# Patient Record
Sex: FEMALE | Race: WHITE | Employment: OTHER | URBAN - METROPOLITAN AREA
[De-identification: names, ages, dates, MRNs, and addresses within clinical notes are randomized per-mention and may not be internally consistent; named-entity substitution may affect disease eponyms.]

---

## 2017-02-10 ENCOUNTER — OFFICE VISIT (OUTPATIENT)
Dept: HEMATOLOGY/ONCOLOGY | Facility: HOSPITAL | Age: 62
End: 2017-02-10
Attending: INTERNAL MEDICINE
Payer: COMMERCIAL

## 2017-02-10 VITALS
DIASTOLIC BLOOD PRESSURE: 79 MMHG | TEMPERATURE: 98 F | HEART RATE: 77 BPM | SYSTOLIC BLOOD PRESSURE: 158 MMHG | BODY MASS INDEX: 38.77 KG/M2 | RESPIRATION RATE: 18 BRPM | HEIGHT: 62.99 IN | WEIGHT: 218.81 LBS | OXYGEN SATURATION: 95 %

## 2017-02-10 DIAGNOSIS — Z85.3 HISTORY OF BREAST CANCER: Primary | ICD-10-CM

## 2017-02-10 PROCEDURE — 99213 OFFICE O/P EST LOW 20 MIN: CPT | Performed by: INTERNAL MEDICINE

## 2017-02-10 RX ORDER — METOPROLOL SUCCINATE 25 MG/1
TABLET, EXTENDED RELEASE ORAL
Refills: 9 | Status: ON HOLD | COMMUNITY
Start: 2017-02-09 | End: 2019-04-18

## 2017-02-10 RX ORDER — ANASTROZOLE 1 MG/1
1 TABLET ORAL DAILY
Qty: 90 TABLET | Refills: 3 | Status: SHIPPED | OUTPATIENT
Start: 2017-02-10 | End: 2017-02-16

## 2017-02-10 RX ORDER — AMLODIPINE BESYLATE 5 MG/1
TABLET ORAL
Refills: 1 | COMMUNITY
Start: 2017-01-12 | End: 2017-02-10 | Stop reason: DRUGHIGH

## 2017-02-10 NOTE — PROGRESS NOTES
Pt is here for MD f/u for breast cancer. Last mammogram was in September with Dr Norma Diaz. Last DEXA was in 2015. On Arimidex. Tolerating well. Denies any complaints.        Education Record    Learner:  Patient    Disease / Diagnosis:  Breast cancer     Ba

## 2017-02-13 NOTE — PROGRESS NOTES
[facility]    PATIENT'S NAME: Kymberly Brendan   ATTENDING PHYSICIAN: Silvia Willoughby M.D.    PATIENT ACCOUNT #: [de-identified] LOCATION: [address] [place] [address] RECORD #: [id number] YOB: 1955   DATE OF SERVICE: 02/10/2017       CANCER CENT 77, respiratory rate 20, temperature 98.0. HEENT:  Unremarkable. She has pink conjunctivae, anicteric sclerae. Pharynx without lesions. LYMPHATICS:  She has no cervical, supraclavicular, or axillary adenopathy.    LUNGS:  Resonant to percussion and c

## 2017-02-16 ENCOUNTER — TELEPHONE (OUTPATIENT)
Dept: HEMATOLOGY/ONCOLOGY | Facility: HOSPITAL | Age: 62
End: 2017-02-16

## 2017-02-16 RX ORDER — ANASTROZOLE 1 MG/1
1 TABLET ORAL DAILY
Qty: 90 TABLET | Refills: 3 | Status: SHIPPED | OUTPATIENT
Start: 2017-02-16 | End: 2018-02-16

## 2017-02-16 NOTE — TELEPHONE ENCOUNTER
Had not heard from pharmacy re: Anastrozole refill. Verified pharmacy information. Script resent to [facility] in [place].

## 2018-02-13 PROBLEM — M25.362 KNEE INSTABILITY, LEFT: Status: ACTIVE | Noted: 2018-02-13

## 2018-02-13 PROBLEM — Z96.651 STATUS POST RIGHT KNEE REPLACEMENT: Status: ACTIVE | Noted: 2018-02-13

## 2018-02-13 PROBLEM — Z96.652 STATUS POST LEFT KNEE REPLACEMENT: Status: ACTIVE | Noted: 2018-02-13

## 2018-02-13 PROBLEM — S80.01XA CONTUSION OF RIGHT KNEE, INITIAL ENCOUNTER: Status: ACTIVE | Noted: 2018-02-13

## 2018-02-13 PROBLEM — M25.562 LEFT KNEE PAIN, UNSPECIFIED CHRONICITY: Status: ACTIVE | Noted: 2018-02-13
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
## 2018-02-16 ENCOUNTER — OFFICE VISIT (OUTPATIENT)
Dept: HEMATOLOGY/ONCOLOGY | Facility: HOSPITAL | Age: 63
End: 2018-02-16
Attending: INTERNAL MEDICINE
Payer: COMMERCIAL

## 2018-02-16 VITALS
TEMPERATURE: 98 F | WEIGHT: 211.19 LBS | HEART RATE: 79 BPM | DIASTOLIC BLOOD PRESSURE: 83 MMHG | OXYGEN SATURATION: 96 % | HEIGHT: 62.99 IN | RESPIRATION RATE: 18 BRPM | BODY MASS INDEX: 37.42 KG/M2 | SYSTOLIC BLOOD PRESSURE: 143 MMHG

## 2018-02-16 DIAGNOSIS — Z85.3 HISTORY OF BREAST CANCER: Primary | ICD-10-CM

## 2018-02-16 PROCEDURE — 99213 OFFICE O/P EST LOW 20 MIN: CPT | Performed by: INTERNAL MEDICINE

## 2018-02-16 RX ORDER — ANASTROZOLE 1 MG/1
1 TABLET ORAL DAILY
Qty: 90 TABLET | Refills: 3 | Status: SHIPPED | OUTPATIENT
Start: 2018-02-16 | End: 2019-02-15

## 2018-02-16 NOTE — PROGRESS NOTES
Patient is here for MD f/u for breast cancer. Pt is on Arimidex daily. Patient had DEXA scan and mammogram in October with Dr Marty Galindo. Feeling well. No complaints.        Education Record    Learner:  Patient    Disease / Diagnosis:  Breast Cancer     [facility]

## 2018-02-26 NOTE — PROGRESS NOTES
[facility]    PATIENT'S NAME: Wing Bloom   ATTENDING PHYSICIAN: Hiro Hughes M.D.    PATIENT ACCOUNT #: [de-identified] LOCATION: [address] [place] [address] RECORD #: [id number] YOB: 1955   DATE OF SERVICE: 02/16/2018       CANCER RUTH GENERAL:  She is a well-developed, obese female in no acute distress. VITAL SIGNS:  Her performance status is 0. Her weight is 211 pounds. She is 5 feet 3 inches. Blood pressure is 143/83, pulse 79, respiratory rate is 20, temperature is 98. 3.   HETESS

## 2018-10-10 ENCOUNTER — MED REC SCAN ONLY (OUTPATIENT)
Dept: HEMATOLOGY/ONCOLOGY | Facility: HOSPITAL | Age: 63
End: 2018-10-10

## 2019-02-15 ENCOUNTER — OFFICE VISIT (OUTPATIENT)
Dept: HEMATOLOGY/ONCOLOGY | Facility: HOSPITAL | Age: 64
End: 2019-02-15
Attending: INTERNAL MEDICINE
Payer: COMMERCIAL

## 2019-02-15 VITALS
HEIGHT: 62.99 IN | DIASTOLIC BLOOD PRESSURE: 79 MMHG | HEART RATE: 84 BPM | TEMPERATURE: 99 F | RESPIRATION RATE: 18 BRPM | WEIGHT: 210 LBS | SYSTOLIC BLOOD PRESSURE: 144 MMHG | BODY MASS INDEX: 37.21 KG/M2 | OXYGEN SATURATION: 95 %

## 2019-02-15 DIAGNOSIS — Z85.3 HISTORY OF BREAST CANCER: ICD-10-CM

## 2019-02-15 DIAGNOSIS — Z78.0 ASYMPTOMATIC MENOPAUSAL STATE: Primary | ICD-10-CM

## 2019-02-15 PROCEDURE — 99213 OFFICE O/P EST LOW 20 MIN: CPT | Performed by: INTERNAL MEDICINE

## 2019-02-15 RX ORDER — ANASTROZOLE 1 MG/1
1 TABLET ORAL DAILY
Qty: 90 TABLET | Refills: 3 | Status: SHIPPED | OUTPATIENT
Start: 2019-02-15 | End: 2020-02-16

## 2019-02-15 RX ORDER — APIXABAN 5 MG/1
1 TABLET, FILM COATED ORAL 2 TIMES DAILY
Refills: 10 | COMMUNITY
Start: 2019-02-06 | End: 2019-04-05

## 2019-02-15 NOTE — PROGRESS NOTES
Patient is here for MD one year f/u for breast cancer. Patient had a mammogram in October. On Arimidex daily. Tolerating well. No complaints.        Education Record    Learner:  Patient    Disease / Diagnosis:  Breast cancer     Barriers / Limitations:  No

## 2019-02-20 RX ORDER — ANASTROZOLE 1 MG/1
TABLET ORAL
Qty: 90 TABLET | Refills: 3 | Status: SHIPPED | OUTPATIENT
Start: 2019-02-20 | End: 2019-04-05

## 2019-03-04 PROBLEM — G47.30 SLEEP APNEA: Status: ACTIVE | Noted: 2018-12-15

## 2019-03-04 PROBLEM — E27.9 DISORDER OF ADRENAL GLAND (HCC): Status: ACTIVE | Noted: 2019-03-04

## 2019-03-04 PROBLEM — E03.9 HYPOTHYROIDISM: Status: ACTIVE | Noted: 2019-03-04

## 2019-03-04 PROBLEM — M19.90 OSTEOARTHROSIS: Status: ACTIVE | Noted: 2019-03-04

## 2019-03-04 PROBLEM — K21.9 GASTROESOPHAGEAL REFLUX DISEASE: Status: ACTIVE | Noted: 2019-03-04

## 2019-03-04 PROBLEM — I10 BENIGN ESSENTIAL HYPERTENSION: Status: ACTIVE | Noted: 2019-03-04

## 2019-03-04 PROBLEM — M85.80 OSTEOPENIA: Status: ACTIVE | Noted: 2019-03-04

## 2019-03-04 PROBLEM — I48.91 ATRIAL FIBRILLATION (HCC): Status: ACTIVE | Noted: 2018-07-30

## 2019-03-04 RX ORDER — LISINOPRIL 10 MG/1
10 TABLET ORAL DAILY
COMMUNITY
End: 2019-04-05

## 2019-03-04 RX ORDER — CELECOXIB 200 MG/1
200 CAPSULE ORAL 2 TIMES DAILY
COMMUNITY
End: 2019-04-05

## 2019-03-04 RX ORDER — LEVOTHYROXINE SODIUM 0.15 MG/1
150 TABLET ORAL DAILY
COMMUNITY
End: 2019-04-05

## 2019-03-04 RX ORDER — AMLODIPINE BESYLATE 10 MG/1
10 TABLET ORAL DAILY
COMMUNITY
End: 2019-04-05

## 2019-03-26 ENCOUNTER — LAB ENCOUNTER (OUTPATIENT)
Dept: LAB | Age: 64
End: 2019-03-26
Attending: ORTHOPAEDIC SURGERY
Payer: COMMERCIAL

## 2019-03-26 DIAGNOSIS — M25.562 LEFT KNEE PAIN: Primary | ICD-10-CM

## 2019-03-26 DIAGNOSIS — Z96.652 PRESENCE OF LEFT ARTIFICIAL KNEE JOINT: ICD-10-CM

## 2019-03-26 LAB
BASOPHIL SYNOVIAL FLUID: 0 %
CRYSTALS: NEGATIVE
EOSINOPHIL SYNOVIAL FLUID: 0 %
LYMPH SYNOVIAL FLUID: 12 %
MON/MAC/HIST SYNOVIAL FLUID: 17 %
NEUTROPHIL SYNOVIAL FLUID: 71 % (ref ?–20)
RBC SYNOVIAL FLUID: ABNORMAL /MM3
TOTAL CELLS COUNTED: 100
WBC SYNOVIAL FLUID: ABNORMAL /MM3 (ref ?–150)

## 2019-03-26 PROCEDURE — 87205 SMEAR GRAM STAIN: CPT

## 2019-03-26 PROCEDURE — 89051 BODY FLUID CELL COUNT: CPT

## 2019-03-26 PROCEDURE — 87070 CULTURE OTHR SPECIMN AEROBIC: CPT

## 2019-03-26 PROCEDURE — 89060 EXAM SYNOVIAL FLUID CRYSTALS: CPT

## 2019-04-05 RX ORDER — ACETAMINOPHEN 500 MG
1000 TABLET ORAL ONCE
Status: CANCELLED | OUTPATIENT
Start: 2019-04-05 | End: 2019-04-05

## 2019-04-11 ENCOUNTER — ANESTHESIA EVENT (OUTPATIENT)
Dept: SURGERY | Facility: HOSPITAL | Age: 64
DRG: 486 | End: 2019-04-11
Payer: COMMERCIAL

## 2019-04-12 ENCOUNTER — HOSPITAL ENCOUNTER (INPATIENT)
Facility: HOSPITAL | Age: 64
LOS: 6 days | Discharge: HOME HEALTH | DRG: 486 | End: 2019-04-18
Attending: ORTHOPAEDIC SURGERY | Admitting: ORTHOPAEDIC SURGERY
Payer: COMMERCIAL

## 2019-04-12 ENCOUNTER — APPOINTMENT (OUTPATIENT)
Dept: GENERAL RADIOLOGY | Facility: HOSPITAL | Age: 64
DRG: 486 | End: 2019-04-12
Attending: ORTHOPAEDIC SURGERY
Payer: COMMERCIAL

## 2019-04-12 ENCOUNTER — ANESTHESIA (OUTPATIENT)
Dept: SURGERY | Facility: HOSPITAL | Age: 64
DRG: 486 | End: 2019-04-12
Payer: COMMERCIAL

## 2019-04-12 DIAGNOSIS — M00.9 INFECTION OF LEFT KNEE (HCC): ICD-10-CM

## 2019-04-12 PROCEDURE — 87176 TISSUE HOMOGENIZATION CULTR: CPT | Performed by: ORTHOPAEDIC SURGERY

## 2019-04-12 PROCEDURE — 76942 ECHO GUIDE FOR BIOPSY: CPT | Performed by: ORTHOPAEDIC SURGERY

## 2019-04-12 PROCEDURE — 87070 CULTURE OTHR SPECIMN AEROBIC: CPT | Performed by: ORTHOPAEDIC SURGERY

## 2019-04-12 PROCEDURE — 0SUW09Z SUPPLEMENT LEFT KNEE JOINT, TIBIAL SURFACE WITH LINER, OPEN APPROACH: ICD-10-PCS | Performed by: ORTHOPAEDIC SURGERY

## 2019-04-12 PROCEDURE — 87075 CULTR BACTERIA EXCEPT BLOOD: CPT | Performed by: ORTHOPAEDIC SURGERY

## 2019-04-12 PROCEDURE — 89050 BODY FLUID CELL COUNT: CPT | Performed by: ORTHOPAEDIC SURGERY

## 2019-04-12 PROCEDURE — 73560 X-RAY EXAM OF KNEE 1 OR 2: CPT | Performed by: ORTHOPAEDIC SURGERY

## 2019-04-12 PROCEDURE — 3E0T3BZ INTRODUCTION OF ANESTHETIC AGENT INTO PERIPHERAL NERVES AND PLEXI, PERCUTANEOUS APPROACH: ICD-10-PCS | Performed by: ANESTHESIOLOGY

## 2019-04-12 PROCEDURE — 0SPD09Z REMOVAL OF LINER FROM LEFT KNEE JOINT, OPEN APPROACH: ICD-10-PCS | Performed by: ORTHOPAEDIC SURGERY

## 2019-04-12 PROCEDURE — 87205 SMEAR GRAM STAIN: CPT | Performed by: ORTHOPAEDIC SURGERY

## 2019-04-12 PROCEDURE — 88305 TISSUE EXAM BY PATHOLOGIST: CPT | Performed by: ORTHOPAEDIC SURGERY

## 2019-04-12 PROCEDURE — 89060 EXAM SYNOVIAL FLUID CRYSTALS: CPT | Performed by: ORTHOPAEDIC SURGERY

## 2019-04-12 PROCEDURE — 89051 BODY FLUID CELL COUNT: CPT | Performed by: ORTHOPAEDIC SURGERY

## 2019-04-12 DEVICE — IMPLANTABLE DEVICE: Type: IMPLANTABLE DEVICE | Site: KNEE | Status: FUNCTIONAL

## 2019-04-12 RX ORDER — METOPROLOL SUCCINATE 25 MG/1
25 TABLET, EXTENDED RELEASE ORAL
Status: DISCONTINUED | OUTPATIENT
Start: 2019-04-13 | End: 2019-04-16

## 2019-04-12 RX ORDER — DIPHENHYDRAMINE HYDROCHLORIDE 50 MG/ML
12.5 INJECTION INTRAMUSCULAR; INTRAVENOUS EVERY 4 HOURS PRN
Status: DISCONTINUED | OUTPATIENT
Start: 2019-04-12 | End: 2019-04-18

## 2019-04-12 RX ORDER — AMLODIPINE BESYLATE 5 MG/1
10 TABLET ORAL DAILY
Status: DISCONTINUED | OUTPATIENT
Start: 2019-04-13 | End: 2019-04-13

## 2019-04-12 RX ORDER — ANASTROZOLE 1 MG/1
1 TABLET ORAL DAILY
Status: DISCONTINUED | OUTPATIENT
Start: 2019-04-13 | End: 2019-04-18

## 2019-04-12 RX ORDER — METOCLOPRAMIDE HYDROCHLORIDE 5 MG/ML
10 INJECTION INTRAMUSCULAR; INTRAVENOUS EVERY 6 HOURS PRN
Status: ACTIVE | OUTPATIENT
Start: 2019-04-12 | End: 2019-04-14

## 2019-04-12 RX ORDER — OXYCODONE HYDROCHLORIDE 5 MG/1
5 TABLET ORAL EVERY 4 HOURS PRN
Status: DISPENSED | OUTPATIENT
Start: 2019-04-12 | End: 2019-04-14

## 2019-04-12 RX ORDER — OXYCODONE HYDROCHLORIDE 10 MG/1
10 TABLET ORAL EVERY 4 HOURS PRN
Status: DISPENSED | OUTPATIENT
Start: 2019-04-12 | End: 2019-04-14

## 2019-04-12 RX ORDER — HYDROMORPHONE HYDROCHLORIDE 1 MG/ML
0.4 INJECTION, SOLUTION INTRAMUSCULAR; INTRAVENOUS; SUBCUTANEOUS EVERY 2 HOUR PRN
Status: ACTIVE | OUTPATIENT
Start: 2019-04-12 | End: 2019-04-14

## 2019-04-12 RX ORDER — TIZANIDINE 4 MG/1
4 TABLET ORAL 3 TIMES DAILY PRN
Status: DISCONTINUED | OUTPATIENT
Start: 2019-04-12 | End: 2019-04-13

## 2019-04-12 RX ORDER — ACETAMINOPHEN 325 MG/1
650 TABLET ORAL 4 TIMES DAILY
Status: COMPLETED | OUTPATIENT
Start: 2019-04-12 | End: 2019-04-13

## 2019-04-12 RX ORDER — HYDROCODONE BITARTRATE AND ACETAMINOPHEN 10; 325 MG/1; MG/1
1-2 TABLET ORAL EVERY 4 HOURS PRN
Qty: 60 TABLET | Refills: 0 | Status: SHIPPED | OUTPATIENT
Start: 2019-04-12 | End: 2021-02-17

## 2019-04-12 RX ORDER — HYDROCODONE BITARTRATE AND ACETAMINOPHEN 10; 325 MG/1; MG/1
2 TABLET ORAL AS NEEDED
Status: DISCONTINUED | OUTPATIENT
Start: 2019-04-12 | End: 2019-04-12 | Stop reason: HOSPADM

## 2019-04-12 RX ORDER — OXYCODONE HYDROCHLORIDE 15 MG/1
15 TABLET ORAL EVERY 4 HOURS PRN
Status: ACTIVE | OUTPATIENT
Start: 2019-04-12 | End: 2019-04-14

## 2019-04-12 RX ORDER — HYDROCODONE BITARTRATE AND ACETAMINOPHEN 10; 325 MG/1; MG/1
1 TABLET ORAL AS NEEDED
Status: DISCONTINUED | OUTPATIENT
Start: 2019-04-12 | End: 2019-04-12 | Stop reason: HOSPADM

## 2019-04-12 RX ORDER — SODIUM PHOSPHATE, DIBASIC AND SODIUM PHOSPHATE, MONOBASIC 7; 19 G/133ML; G/133ML
1 ENEMA RECTAL ONCE AS NEEDED
Status: DISCONTINUED | OUTPATIENT
Start: 2019-04-12 | End: 2019-04-18

## 2019-04-12 RX ORDER — HYDROMORPHONE HYDROCHLORIDE 1 MG/ML
0.8 INJECTION, SOLUTION INTRAMUSCULAR; INTRAVENOUS; SUBCUTANEOUS EVERY 2 HOUR PRN
Status: DISPENSED | OUTPATIENT
Start: 2019-04-12 | End: 2019-04-14

## 2019-04-12 RX ORDER — NALOXONE HYDROCHLORIDE 0.4 MG/ML
80 INJECTION, SOLUTION INTRAMUSCULAR; INTRAVENOUS; SUBCUTANEOUS AS NEEDED
Status: DISCONTINUED | OUTPATIENT
Start: 2019-04-12 | End: 2019-04-12 | Stop reason: HOSPADM

## 2019-04-12 RX ORDER — MEPERIDINE HYDROCHLORIDE 25 MG/ML
12.5 INJECTION INTRAMUSCULAR; INTRAVENOUS; SUBCUTANEOUS AS NEEDED
Status: DISCONTINUED | OUTPATIENT
Start: 2019-04-12 | End: 2019-04-12 | Stop reason: HOSPADM

## 2019-04-12 RX ORDER — CLINDAMYCIN PHOSPHATE 900 MG/50ML
900 INJECTION INTRAVENOUS ONCE
Status: DISCONTINUED | OUTPATIENT
Start: 2019-04-12 | End: 2019-04-12 | Stop reason: HOSPADM

## 2019-04-12 RX ORDER — SCOLOPAMINE TRANSDERMAL SYSTEM 1 MG/1
1 PATCH, EXTENDED RELEASE TRANSDERMAL ONCE
Status: COMPLETED | OUTPATIENT
Start: 2019-04-12 | End: 2019-04-15

## 2019-04-12 RX ORDER — SENNOSIDES 8.6 MG
17.2 TABLET ORAL NIGHTLY
Status: DISCONTINUED | OUTPATIENT
Start: 2019-04-12 | End: 2019-04-18

## 2019-04-12 RX ORDER — CLINDAMYCIN PHOSPHATE 900 MG/50ML
900 INJECTION INTRAVENOUS EVERY 8 HOURS
Status: DISCONTINUED | OUTPATIENT
Start: 2019-04-12 | End: 2019-04-13

## 2019-04-12 RX ORDER — SODIUM CHLORIDE, SODIUM LACTATE, POTASSIUM CHLORIDE, CALCIUM CHLORIDE 600; 310; 30; 20 MG/100ML; MG/100ML; MG/100ML; MG/100ML
INJECTION, SOLUTION INTRAVENOUS CONTINUOUS
Status: DISCONTINUED | OUTPATIENT
Start: 2019-04-12 | End: 2019-04-12

## 2019-04-12 RX ORDER — LEVOTHYROXINE SODIUM 0.15 MG/1
150 TABLET ORAL
Status: DISCONTINUED | OUTPATIENT
Start: 2019-04-13 | End: 2019-04-18

## 2019-04-12 RX ORDER — HYDROMORPHONE HYDROCHLORIDE 1 MG/ML
0.4 INJECTION, SOLUTION INTRAMUSCULAR; INTRAVENOUS; SUBCUTANEOUS EVERY 5 MIN PRN
Status: DISCONTINUED | OUTPATIENT
Start: 2019-04-12 | End: 2019-04-12 | Stop reason: HOSPADM

## 2019-04-12 RX ORDER — SODIUM CHLORIDE, SODIUM LACTATE, POTASSIUM CHLORIDE, CALCIUM CHLORIDE 600; 310; 30; 20 MG/100ML; MG/100ML; MG/100ML; MG/100ML
INJECTION, SOLUTION INTRAVENOUS CONTINUOUS
Status: DISCONTINUED | OUTPATIENT
Start: 2019-04-12 | End: 2019-04-12 | Stop reason: HOSPADM

## 2019-04-12 RX ORDER — DIPHENHYDRAMINE HYDROCHLORIDE 50 MG/ML
25 INJECTION INTRAMUSCULAR; INTRAVENOUS ONCE AS NEEDED
Status: ACTIVE | OUTPATIENT
Start: 2019-04-12 | End: 2019-04-12

## 2019-04-12 RX ORDER — LOSARTAN POTASSIUM 25 MG/1
25 TABLET ORAL DAILY
Status: DISCONTINUED | OUTPATIENT
Start: 2019-04-13 | End: 2019-04-13

## 2019-04-12 RX ORDER — ONDANSETRON 2 MG/ML
4 INJECTION INTRAMUSCULAR; INTRAVENOUS AS NEEDED
Status: DISCONTINUED | OUTPATIENT
Start: 2019-04-12 | End: 2019-04-12 | Stop reason: HOSPADM

## 2019-04-12 RX ORDER — SCOLOPAMINE TRANSDERMAL SYSTEM 1 MG/1
PATCH, EXTENDED RELEASE TRANSDERMAL
Status: DISPENSED
Start: 2019-04-12 | End: 2019-04-12

## 2019-04-12 RX ORDER — ACETAMINOPHEN 325 MG/1
650 TABLET ORAL EVERY 6 HOURS PRN
Status: DISCONTINUED | OUTPATIENT
Start: 2019-04-12 | End: 2019-04-12 | Stop reason: HOSPADM

## 2019-04-12 RX ORDER — BISACODYL 10 MG
10 SUPPOSITORY, RECTAL RECTAL
Status: DISCONTINUED | OUTPATIENT
Start: 2019-04-12 | End: 2019-04-18

## 2019-04-12 RX ORDER — METOCLOPRAMIDE HYDROCHLORIDE 5 MG/ML
10 INJECTION INTRAMUSCULAR; INTRAVENOUS AS NEEDED
Status: DISCONTINUED | OUTPATIENT
Start: 2019-04-12 | End: 2019-04-12 | Stop reason: HOSPADM

## 2019-04-12 RX ORDER — MELATONIN
325
Status: DISCONTINUED | OUTPATIENT
Start: 2019-04-13 | End: 2019-04-18

## 2019-04-12 RX ORDER — DIPHENHYDRAMINE HCL 25 MG
25 CAPSULE ORAL EVERY 4 HOURS PRN
Status: DISCONTINUED | OUTPATIENT
Start: 2019-04-12 | End: 2019-04-18

## 2019-04-12 RX ORDER — ONDANSETRON 2 MG/ML
4 INJECTION INTRAMUSCULAR; INTRAVENOUS EVERY 4 HOURS PRN
Status: ACTIVE | OUTPATIENT
Start: 2019-04-12 | End: 2019-04-14

## 2019-04-12 RX ORDER — POLYETHYLENE GLYCOL 3350 17 G/17G
17 POWDER, FOR SOLUTION ORAL DAILY PRN
Status: DISCONTINUED | OUTPATIENT
Start: 2019-04-12 | End: 2019-04-18

## 2019-04-12 RX ORDER — HYDROMORPHONE HYDROCHLORIDE 1 MG/ML
0.2 INJECTION, SOLUTION INTRAMUSCULAR; INTRAVENOUS; SUBCUTANEOUS EVERY 2 HOUR PRN
Status: DISPENSED | OUTPATIENT
Start: 2019-04-12 | End: 2019-04-14

## 2019-04-12 RX ORDER — SODIUM CHLORIDE, SODIUM LACTATE, POTASSIUM CHLORIDE, CALCIUM CHLORIDE 600; 310; 30; 20 MG/100ML; MG/100ML; MG/100ML; MG/100ML
INJECTION, SOLUTION INTRAVENOUS CONTINUOUS
Status: DISCONTINUED | OUTPATIENT
Start: 2019-04-12 | End: 2019-04-13

## 2019-04-12 NOTE — RESPIRATORY THERAPY NOTE
Pt is on MAGDY protocol and does not use CPAP at home. Will continue to monitor pt with pulse ox at night.

## 2019-04-12 NOTE — ANESTHESIA PREPROCEDURE EVALUATION
PRE-OP EVALUATION    Patient Name: Torrance Schaumann    Pre-op Diagnosis: Infection of left knee ([address] [M00.9]    Procedure(s):  IRRIGATION AND DEBRIDEMENT LEFT KNEE, poly exchange     Surgeon(s) and Role:     Samantha Lyles MD - Primary    Pre-op vitals Laterality Date   • ECHO 2D, CARDIO (INTERNAL)  2016   • KNEE REPLACEMENT SURGERY Right 4/6/11    right knee   • KNEE REPLACEMENT SURGERY Left 2012   • OTHER SURGICAL HISTORY      lumpectomy 2003 right breast   • OTHER SURGICAL HISTORY      knee scope 2008

## 2019-04-12 NOTE — CONSULTS
[facility] hospitalist initial consult note  PCP Rae Serra   Patient was seen/examined on 4/12/19  Consulted at the request of Dr. Vick Augustin  Reason for consult medical co-managemnet  HPI 60 yo female with multiple medical problems including but not limited file      Food insecurity:        Worry: Not on file        Inability: Not on file      Transportation needs:        Medical: Not on file        Non-medical: Not on file    Tobacco Use      Smoking status: Former Smoker        Quit date: 4/5/2002        Ana Luisa Hager Sodium (SYNTHROID) 150 MCG Oral Tab 1 TABLET DAILY Disp:  Rfl:    VITAMIN D 1000 UNIT OR CAPS 1 CAPSULE DAILY Disp:  Rfl:    MULTI-VITAMIN OR TABS 1 TABLET DAILY Disp:  Rfl:    TYLENOL OR PRN Disp:  Rfl:    CALCIUM + D + K [phone] MG-UNT-MCG OR TABS 1 T

## 2019-04-12 NOTE — PLAN OF CARE
Problem: Patient/Family Goals  Goal: Patient/Family Long Term Goal  Description  Patient's Long Term Goal: \"Be able to resume my usual activities and have less knee pain. \"    Interventions:  -Take pain meds as ordered, follow pt/ot guidelines for activ strengthening/mobility  - Encourage toileting schedule  Outcome: Progressing     Problem: MUSCULOSKELETAL - ADULT  Goal: Return mobility to safest level of function  Description  INTERVENTIONS:  - Assess patient stability and activity tolerance for standin patient states she has mild sleep apnea, but does not wear masks at home. Rn notified R.T right away. Instructed on I.S use. Vitals stable. Will cont to monitor.

## 2019-04-12 NOTE — BRIEF OP NOTE
Pre-Operative Diagnosis: Infection of left knee ([address] [M00.9]     Post-Operative Diagnosis: Infection of left knee ([address] [M00.9]      Procedure Performed:   Procedure(s):  IRRIGATION AND DEBRIDEMENT LEFT KNEE, poly exchange     Surgeon(s) and Role:     * W

## 2019-04-12 NOTE — ANESTHESIA POSTPROCEDURE EVALUATION
[address] Patient Status:  Hospital Outpatient Surgery   Age/Gender 61year old female MRN [id number]   Location [facility] Attending Brenna Mendieta MD   Hosp Day # 0 PCP [facility]       An

## 2019-04-12 NOTE — H&P
[address] [place]    PATIENT'S NAME: Amanda Broja   ATTENDING PHYSICIAN: Collins Ayon M.D.    PATIENT ACCOUNT#:   [de-identified]    LOCATION:    MEDICAL RECORD #:   [id number]       YOB: 1955  ADMISSION DATE:       04/12/2019    HISTORY AND cultures and have scheduled this for the patient at [address] on 04/12/2019. The procedure has been explained in detail to the patient, the need for an incision, risks of anesthesia, wound infection, DVT, and ongoing pain and discomfort.   Despite these and o the left knee but good motion. Patient has laxity of her MCL. She is neurovascularly intact. IMPRESSION:  Elevated sedimentation rate and CRP in a patient with 29,000 white cells in her left knee joint with pain, possible joint infection.     PLAN:  Ar

## 2019-04-13 PROCEDURE — 97165 OT EVAL LOW COMPLEX 30 MIN: CPT

## 2019-04-13 PROCEDURE — 97110 THERAPEUTIC EXERCISES: CPT

## 2019-04-13 PROCEDURE — 80048 BASIC METABOLIC PNL TOTAL CA: CPT | Performed by: HOSPITALIST

## 2019-04-13 PROCEDURE — 97535 SELF CARE MNGMENT TRAINING: CPT

## 2019-04-13 PROCEDURE — 97161 PT EVAL LOW COMPLEX 20 MIN: CPT

## 2019-04-13 PROCEDURE — 85027 COMPLETE CBC AUTOMATED: CPT | Performed by: ORTHOPAEDIC SURGERY

## 2019-04-13 PROCEDURE — 85025 COMPLETE CBC W/AUTO DIFF WBC: CPT | Performed by: HOSPITALIST

## 2019-04-13 PROCEDURE — 97116 GAIT TRAINING THERAPY: CPT

## 2019-04-13 RX ORDER — HYDROCODONE BITARTRATE AND ACETAMINOPHEN 10; 325 MG/1; MG/1
2 TABLET ORAL EVERY 4 HOURS PRN
Status: DISCONTINUED | OUTPATIENT
Start: 2019-04-14 | End: 2019-04-18

## 2019-04-13 RX ORDER — HYDROCODONE BITARTRATE AND ACETAMINOPHEN 10; 325 MG/1; MG/1
1 TABLET ORAL EVERY 4 HOURS PRN
Status: DISCONTINUED | OUTPATIENT
Start: 2019-04-14 | End: 2019-04-18

## 2019-04-13 RX ORDER — TIZANIDINE 2 MG/1
2 TABLET ORAL 3 TIMES DAILY PRN
Status: DISCONTINUED | OUTPATIENT
Start: 2019-04-13 | End: 2019-04-18

## 2019-04-13 RX ORDER — SODIUM CHLORIDE 9 MG/ML
INJECTION, SOLUTION INTRAVENOUS CONTINUOUS
Status: DISCONTINUED | OUTPATIENT
Start: 2019-04-13 | End: 2019-04-18

## 2019-04-13 NOTE — PROGRESS NOTES
S/p left knee I and D and poly exchange  Doing well     04/13/19  0330   BP: 98/59   Pulse: 65   Resp: 12   Temp: 98.3 °F (36.8 °C)     Recent Labs   Lab 04/13/19  0450   RBC 3.75*  3.75*   HGB 10.0*  10.0*   HCT 31.4*  31.4*   MCV 83.7  83.7   MCH 26.7  2

## 2019-04-13 NOTE — CONSULTS
INFECTIOUS DISEASE CONSULT NOTE    Ofe Schulte Patient Status:  Inpatient    1955 MRN [id number]   [facility] 3SW-A Attending Trino Carson MD   Hosp Day # 1 PCP Sobeida Pina • Cancer Father         lung ca   • Heart Disorder Father    • Heart Disorder Mother    • Other Mother         asthma      reports that she quit smoking about 17 years ago.  She has never used smokeless tobacco. She reports that she does not drink alcohol injection 12.5 mg, 12.5 mg, Intravenous, Q4H PRN  •  apixaban (ELIQUIS) tab 2.5 mg, 2.5 mg, Oral, BID  •  ferrous sulfate EC tab 325 mg, 325 mg, Oral, Daily with breakfast  •  amLODIPine Besylate (NORVASC) tab 10 mg, 10 mg, Oral, Daily  •  anastrozole (KIKA (Preliminary result)    Collection Time: 04/12/19  1:41 PM   Result Value Ref Range    Tissue Smear 1+ WBCs seen N/A    Tissue Smear No organisms seen N/A         Radiology: Xr Knee (1 Or 2 Views), Left (cpt=73560)    Result Date: 4/12/2019  PROCEDURE:  XR

## 2019-04-13 NOTE — PLAN OF CARE
Problem: Impaired Functional Mobility  Goal: Achieve highest/safest level of mobility/gait  Description  Interventions:  - Assess patient's functional ability and stability  - Promote increasing activity/tolerance for mobility and gait  - Educate and eng 63.5

## 2019-04-13 NOTE — OPERATIVE REPORT
[address] [place]    PATIENT'S NAME: Thuan Scott   ATTENDING PHYSICIAN: Evy Osman M.D. OPERATING PHYSICIAN: Evy Osman M.D.    PATIENT ACCOUNT#:   [de-identified]    LOCATION:  [address] [place] [address]  MEDICAL RECORD #:   [id number]       DATE OF BIRTH:  1 and lateral flaps were raised. A quadriceps-splitting medial parapatellar incision was performed. Mirky joint fluid was obtained, which had the consistency of purulence.   Fluid was obtained for cell count as well as soft tissue for aerobic and anaerobic M.D.  d: 04/13/2019 09:42:36  t: 04/13/2019 15:38:41  [facility] [id number]  KF/    cc: Michael Lopez M.D.

## 2019-04-13 NOTE — PLAN OF CARE
Problem: Patient/Family Goals  Goal: Patient/Family Long Term Goal  Description  Patient's Long Term Goal: \"Be able to resume my usual activities and have less knee pain. \"    Interventions:  -Take pain meds as ordered, follow pt/ot guidelines for activ strengthening/mobility  - Encourage toileting schedule  Outcome: Progressing     Problem: MUSCULOSKELETAL - ADULT  Goal: Return mobility to safest level of function  Description  INTERVENTIONS:  - Assess patient stability and activity tolerance for standin eating well. Miralax given. IVF infusing as ordered. Goals and safety precautions discussed. She tolerated P.T session well and is up in chair this afternoon. Will cont to monitor.

## 2019-04-13 NOTE — PHYSICAL THERAPY NOTE
PHYSICAL THERAPY EVALUATION - INPATIENT     Room Number: 381/381-A  Evaluation Date: 4/13/2019  Type of Evaluation: Initial  Physician Order: PT Eval and Treat    Presenting Problem: s/p I and D left knee with poly exchange 4/12/19  Reason for [employer] full time.     SUBJECTIVE  \"I'm doing pretty good\"    Patient self-stated goal is to return home    OBJECTIVE  Precautions: None  Fall Risk: High fall risk    WEIGHT BEARING RESTRICTION  Weight Bearing Restriction: L lower extremity           NAOMI Schaefer ABILITY STATUS  Gait Assessment   Gait Assistance: Maximum assistance(actual cga)  Distance (ft): 125  Assistive Device: Rolling walker  Pattern: L Decreased stance time  Stoop/Curb Assistance: Not tested       Skilled Therapy Provided: Pt presents supine complexity is considered low. These impairments and comorbidities manifest themselves as functional limitations in independent bed mobility, transfers, gait and stair negotiation.   The patient is below baseline and would benefit from skilled inpatient PT

## 2019-04-13 NOTE — PROGRESS NOTES
[address] [facility] Dosing Service    Initial Pharmacokinetic Consult for Vancomycin Dosing     Marylou Anne is a 61year old female who is being treated for left knee infection .   Patient is s/p irrigation and debridement of left knee and intraoperative cult

## 2019-04-13 NOTE — PROGRESS NOTES
[facility] hospitalist daily note  Patient was seen/examined on 4/13/19    S: no chest pain, no SOB, no abd pain, no nausea/emesis    medicaitons in Epic    PE    04/13/19  0745   BP: 116/57   Pulse: 69   Resp: 14   Temp: 98 °F (36.7 °C)     Gen: awake, alert, no

## 2019-04-13 NOTE — PLAN OF CARE
Problem: Patient/Family Goals  Goal: Patient/Family Long Term Goal  Description  Patient's Long Term Goal: \"Be able to resume my usual activities and have less knee pain. \"    Interventions:  -Take pain meds as ordered, follow pt/ot guidelines for activ strengthening/mobility  - Encourage toileting schedule  Outcome: Progressing     Problem: MUSCULOSKELETAL - ADULT  Goal: Return mobility to safest level of function  Description  INTERVENTIONS:  - Assess patient stability and activity tolerance for standin

## 2019-04-13 NOTE — OCCUPATIONAL THERAPY NOTE
OCCUPATIONAL THERAPY QUICK EVALUATION - INPATIENT    Room Number: 381/381-A  Evaluation Date: 4/13/2019     Type of Evaluation: Quick Eval  Presenting Problem: I&D L knee    Physician Order: IP Consult to Occupational Therapy  Reason for Therapy:  ADL/IADL Function: Indep w adls    SUBJECTIVE   I feel pretty good    Patient self-stated goal is to go home    OBJECTIVE  Precautions: None  Fall Risk: High fall risk    WEIGHT BEARING RESTRICTION  Weight Bearing Restriction: L lower extremity           L Lower Ex questions and concerns addressed;SCDs in place; Ice applied    ASSESSMENT     Patient is a 61year old female admitted on 4/12/2019 for I&D L knee. Complete medical history and occupational profile noted above.  Functional outcome measures completed include independently

## 2019-04-13 NOTE — CM/SW NOTE
04/13/19 1000   CM/SW Referral Data   Referral Source Physician   Reason for Referral Discharge planning   Informant Patient   Pertinent Medical Hx   Primary Care Physician Name Nabil Holly.    Patient Info   Patient's Mental Status Alert;Oriented   Vanice Gaucher

## 2019-04-14 PROCEDURE — 97110 THERAPEUTIC EXERCISES: CPT

## 2019-04-14 PROCEDURE — 97116 GAIT TRAINING THERAPY: CPT

## 2019-04-14 PROCEDURE — 82565 ASSAY OF CREATININE: CPT | Performed by: INTERNAL MEDICINE

## 2019-04-14 PROCEDURE — 97530 THERAPEUTIC ACTIVITIES: CPT

## 2019-04-14 PROCEDURE — 80202 ASSAY OF VANCOMYCIN: CPT | Performed by: INTERNAL MEDICINE

## 2019-04-14 PROCEDURE — 85027 COMPLETE CBC AUTOMATED: CPT | Performed by: ORTHOPAEDIC SURGERY

## 2019-04-14 NOTE — PLAN OF CARE
Pt A&Ox4. On ra,  & scds in place. Aquacel dressing has scant amount of serosanguinous drainage, small amount of serosanguinous drainage via drain in knee. Swelling to knee noted. Pain controlled on pain meds. Vss. Iv abx given. Pt awaiting ctx results. from fall injury  Description  INTERVENTIONS:  - Assess pt frequently for physical needs  - Identify cognitive and physical deficits and behaviors that affect risk of falls.   - [place] fall precautions as indicated by assessment.  - Educate pt/family on infection  Description  INTERVENTIONS:  - Assess and document risk factors for pressure ulcer development  - Assess and document skin integrity  - Assess and document dressing/incision, wound bed, drain sites and surrounding tissue  - Implement wound care

## 2019-04-14 NOTE — PHYSICAL THERAPY NOTE
PHYSICAL THERAPY TREATMENT NOTE - INPATIENT    Room Number: 381/381-A     Session: 1   Number of Visits to Meet Established Goals: 3    Presenting Problem: s/p I and D left knee with poly exchange 4/12/19       History related to current admission: Pt is Static Sitting: Fair +  Dynamic Sitting: Fair +           Static Standing: Fair -  Dynamic Standing: Fair -    ACTIVITY TOLERANCE                         O2 WALK met;Call light within reach;RN aware of session/findings; All patient questions and concerns addressed;SCDs in place    ASSESSMENT   Pt continues to progress toward functional goals and is motivated to participate in skilled therapy.    Pt will continue to b

## 2019-04-14 NOTE — PROGRESS NOTES
Acute Pain Service    Post Op Day 2 s/p I&D left knee     Assessed patient in chair. Patient \"states pain is \"not too bad\" at present.  Ambulated with PT and did all PT exercises well -  states Norco is working well to manage pain; denies itching/nausea/

## 2019-04-14 NOTE — PROGRESS NOTES
[facility] hospitalist daily note  Patient was seen/examined on 4/14/19     S: no chest pain, no SOB, no abd pain, no nausea/emesis     medicaitons in Epic     PE    04/14/19  1155   BP: 129/58   Pulse: 76   Resp: 18   Temp: 97.9 °F (36.6 °C)     Gen: awake, aler

## 2019-04-14 NOTE — PLAN OF CARE
Pt is POD#2 of LEFT knee I&D, by Dr. Manuel Meredith. Pt is A/Ox4, denies any headaches. No numbness/tingling to LLE. Pt is on room air, no SOB/distress noted. Hx MAGDY noted, no CPAP. Encouraged IS 10x/hr while awake. Pt is on tele, NSR.  Pt is on Eliquis for VTE prop Notify MD/LIP if interventions unsuccessful or patient reports new pain  - Anticipate increased pain with activity and pre-medicate as appropriate  Outcome: Progressing     Problem: SAFETY ADULT - FALL  Goal: Free from fall injury  Description  INTERVENTIO patient/family in tolerated activity level and precautions   Outcome: Progressing     Problem: SKIN/TISSUE INTEGRITY - ADULT  Goal: Incision(s), wounds(s) or drain site(s) healing without S/S of infection  Description  INTERVENTIONS:  - Assess and document

## 2019-04-14 NOTE — PROGRESS NOTES
[address] [facility] dosing service    Follow-up Pharmacokinetic Consult for Vancomycin Dosing     Caridad Garcia is a 61year old female who is being treated for suspected L knee PJI s/p revision with poly exchange.    Patient is on day 3 of Vancomycin and a l [phone]

## 2019-04-14 NOTE — PROGRESS NOTES
[facility]  Progress Note    Jessica May Patient Status:  Inpatient    1955 MRN [id number]   [facility] 3SW-A Attending Hayes Camarillo MD   [address] Day # 2 PCP [facility] Neighbor     SUBJECTIVE:  INTERVAL HISTORY: S/P  2  Pro

## 2019-04-15 PROBLEM — Z47.89 ORTHOPEDIC AFTERCARE: Status: ACTIVE | Noted: 2019-04-15

## 2019-04-15 PROCEDURE — 02HV33Z INSERTION OF INFUSION DEVICE INTO SUPERIOR VENA CAVA, PERCUTANEOUS APPROACH: ICD-10-PCS | Performed by: ORTHOPAEDIC SURGERY

## 2019-04-15 PROCEDURE — 36573 INSJ PICC RS&I 5 YR+: CPT

## 2019-04-15 PROCEDURE — 93005 ELECTROCARDIOGRAM TRACING: CPT

## 2019-04-15 PROCEDURE — 97116 GAIT TRAINING THERAPY: CPT

## 2019-04-15 PROCEDURE — 97110 THERAPEUTIC EXERCISES: CPT

## 2019-04-15 PROCEDURE — 85027 COMPLETE CBC AUTOMATED: CPT | Performed by: ORTHOPAEDIC SURGERY

## 2019-04-15 PROCEDURE — 82565 ASSAY OF CREATININE: CPT | Performed by: INTERNAL MEDICINE

## 2019-04-15 PROCEDURE — 76937 US GUIDE VASCULAR ACCESS: CPT

## 2019-04-15 PROCEDURE — 93010 ELECTROCARDIOGRAM REPORT: CPT | Performed by: INTERNAL MEDICINE

## 2019-04-15 PROCEDURE — 80202 ASSAY OF VANCOMYCIN: CPT | Performed by: INTERNAL MEDICINE

## 2019-04-15 RX ORDER — METOPROLOL TARTRATE 5 MG/5ML
5 INJECTION INTRAVENOUS ONCE
Status: COMPLETED | OUTPATIENT
Start: 2019-04-15 | End: 2019-04-15

## 2019-04-15 RX ORDER — SODIUM CHLORIDE 0.9 % (FLUSH) 0.9 %
10 SYRINGE (ML) INJECTION AS NEEDED
Status: DISCONTINUED | OUTPATIENT
Start: 2019-04-15 | End: 2019-04-18

## 2019-04-15 NOTE — PLAN OF CARE
Patient reports minimal pain to her left knee that is well controlled with PO pain medication. Denies any numbness or tingling in her BLE, incision is with aquacel with some old drainage on it, ice therapy kept on.  The drain was removed this morning by

## 2019-04-15 NOTE — CM/SW NOTE
Informed by RN that pt can d/c today if home IVAB set up. SW spoke with Gabriela Tobias at [facility] [phone]. 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   She notes that pt has not meet her ded or oop as yet; once she meets ded, she will be covered at 80%

## 2019-04-15 NOTE — PROGRESS NOTES
[address] [place] [address]  TEL: [phone]  FAX: [phone]    Elidaanuja Fiona Patient Status:  Inpatient    1955 MRN [id number]   [facility] 3SW-A Attending Marcy Henry MD   Hosp Day # 3 PCP [facility] Tissue Culture Result No Growth 2 Days N/A    Tissue Smear 1+ WBCs seen N/A    Tissue Smear No organisms seen N/A   2.  ANAEROBIC CULTURE     Status: None (Preliminary result)    Collection Time: 04/12/19  1:41 PM   Result Value Ref Range    Anaerobic Cultu

## 2019-04-15 NOTE — PROGRESS NOTES
[address] Patient Status:  Inpatient    1955 MRN [id number]   [facility] 3SW-A Attending Gaston Clark MD   Hosp Day # 3 PCP Maureen Stern is a 61year old female patient.     Lin Medical History:   Diagnosis Date   • Arrhythmia     A fib and PVC's   • Cancer ([address]     Breast cancer 2002   • Exposure to medical diagnostic radiation    • History of kidney stones    • HYPOTHYROIDISM    • OSTEOARTHRITIS    • Osteoarthrosis, unspecified

## 2019-04-15 NOTE — PROGRESS NOTES
[facility] Hospitalist Progress Note     Bebeto Casper Patient Status:  Inpatient    1955 MRN [id number]   [facility] 3SW-A Attending Brenna Mendieta MD   Hosp Day # 3 PCP Leatha Batista     CC: follow up    SUBJECTIVE:  No acute e Blocker     Continuous Infusing Medication:  • sodium chloride 100 mL/hr at 04/13/19 2008     PRN Medication:tiZANidine HCl, HYDROcodone-acetaminophen **OR** HYDROcodone-acetaminophen, PEG 3350, magnesium hydroxide, bisacodyl, FLEET ENEMA, diphenhydrAMINE

## 2019-04-15 NOTE — PHYSICAL THERAPY NOTE
PHYSICAL THERAPY TREATMENT NOTE - INPATIENT    Room Number: 381/381-A     Session: 2   Number of Visits to Meet Established Goals: 3    Presenting Problem: s/p I and D left knee with poly exchange 4/12/19       History related to current admission: Pt is Static Sitting: Good  Dynamic Sitting: Fair +           Static Standing: Poor +  Dynamic Standing: Poor +    ACTIVITY TOLERANCE                         O2 WALK needs in reach. RN notified of session. Increased time spent on discussion with pt regarding safety awareness, fall precaution, and activity level. Pt in understanding.      THERAPEUTIC EXERCISES  Lower Extremity Alternating marching  Ankle pumps  Glut s supervision.-Met   Goal #5 Pt will participate in TKA protocol exercise.-Met   Goal #6     Goal Comments: Goals established on 4/13/2019, goals met 4/15/19

## 2019-04-15 NOTE — PLAN OF CARE
Pt alert and oriented x4. . IS use encouraged. Scd's in place. Drain emptied as ordered with scant output. Voiding freely in bathroom with min assist and walker. Pain managed well with norco. IV abx. Awaiting cultures. Will continue to monitor.  Bed alar

## 2019-04-16 ENCOUNTER — APPOINTMENT (OUTPATIENT)
Dept: CV DIAGNOSTICS | Facility: HOSPITAL | Age: 64
DRG: 486 | End: 2019-04-16
Attending: INTERNAL MEDICINE
Payer: COMMERCIAL

## 2019-04-16 PROCEDURE — 80048 BASIC METABOLIC PNL TOTAL CA: CPT | Performed by: INTERNAL MEDICINE

## 2019-04-16 PROCEDURE — 83735 ASSAY OF MAGNESIUM: CPT | Performed by: INTERNAL MEDICINE

## 2019-04-16 PROCEDURE — 82565 ASSAY OF CREATININE: CPT | Performed by: INTERNAL MEDICINE

## 2019-04-16 PROCEDURE — 93306 TTE W/DOPPLER COMPLETE: CPT | Performed by: INTERNAL MEDICINE

## 2019-04-16 PROCEDURE — A4216 STERILE WATER/SALINE, 10 ML: HCPCS | Performed by: INTERNAL MEDICINE

## 2019-04-16 PROCEDURE — 82550 ASSAY OF CK (CPK): CPT | Performed by: INTERNAL MEDICINE

## 2019-04-16 PROCEDURE — 84443 ASSAY THYROID STIM HORMONE: CPT | Performed by: INTERNAL MEDICINE

## 2019-04-16 RX ORDER — METOPROLOL SUCCINATE 25 MG/1
25 TABLET, EXTENDED RELEASE ORAL
Status: DISCONTINUED | OUTPATIENT
Start: 2019-04-16 | End: 2019-04-17

## 2019-04-16 NOTE — PLAN OF CARE
Problem: POD 4 Lt knee I&D    Data: Pt is A+Ox4, on room air, VSS. , SCD's & Tele applied. Aquacel dressing is C/D/I w/ no drainage present. Instructed Pt to use IS 10x per Hr while awake. Pt tolerating diet well with no C/O N/V.  Pt voiding freely to re

## 2019-04-16 NOTE — PLAN OF CARE
Patient seen by cardiology in AM- discussed HR. Orders to increase cardizem drip to 10, additional orders also received. Patient not cleared for discharge per cardiology. 2D echo completed. Patient remains without symptoms.  Per Cards- will continue to hold

## 2019-04-16 NOTE — CONSULTS
[facility]    PATIENT'S NAME: Sharon Santos   ATTENDING PHYSICIAN: Ness Cody M.D.   CONSULTING PHYSICIAN: Cadence Scott M.D.    PATIENT ACCOUNT#:   [de-identified]    LOCATION:  [address] [place] [address]  MEDICAL RECORD #:   [id number]       DATE OF BIRTH: does have a history of hypothyroidism and is on replacement. She has no cardiac complaints.     PAST MEDICAL HISTORY:  Other past medical history includes joint replacements, shoulder problem, breast cancer, hypothyroidism, hypertension, paroxysmal atrial

## 2019-04-16 NOTE — CONSULTS
[facility] Cardiology Consultation NoteLordurga Gunter MD    The patient was interviewed, examined, the chart was reviewed and the consult was dictated. This is a 61year old female with a chief complaint of rapid heartbeat. Impression:  1.   Paroxysmal atrial fib

## 2019-04-16 NOTE — PAYOR COMM NOTE
--------------  ADMISSION REVIEW     Payor: JONATAN ARGUELLES  Subscriber #:  [id number]  Authorization Number: [id number]    Admit date: 4/12/19  Admit time: [address]    ADMISSION DATE:       04/12/2019    HISTORY AND PHYSICAL EXAMINATION scheduled this for the patient at [facility] on 04/12/2019. The procedure has been explained in detail to the patient, the need for an incision, risks of anesthesia, wound infection, DVT, and ongoing pain and discomfort.   Despite these and other risks, the pa the procedure. She does wish to proceed. She has no medical contraindication to the proposed surgery.             4/13/19  S: no chest pain, no SOB, no abd pain, no nausea/emesis     medications in Epic     PE     04/13/19  0745   BP: 116/57   Pulse: 69 ordered              4/15/19  SUBJECTIVE:  No acute events overnight. No new complaints. Pain controled. Tolerating diet. Moving bowels.  Awaiting PICC.     OBJECTIVE:  Temp:  [98 °F (36.7 °C)-98.7 °F (37.1 °C)] 98.2 °F (36.8 °C)  Pulse:  [72-85] 72  Resp: debridement left knee, poly exchange  - ID following, pt on empiric vanco and CTX  - follow cultures - even if negative, will treat with 6 weeks IV abx   - plan for PICC today     # Afib    - home BB  - Monitor on telemetry     # HTN  - holding amlodipine mcg     Date Action Dose Route     4/16/2019 0516 Given 150 mcg Oral       Metoprolol Succinate ER (Toprol XL) 24 hr tab 25 mg     Date Action Dose Route     4/16/2019 0515 Given 25 mg Oral       metoprolol Tartrate (LOPRESSOR) 5 MG/5ML injection 5 mg

## 2019-04-16 NOTE — PROGRESS NOTES
[facility]  TEL: [phone]  FAX: [phone]    Rachel Christine Patient Status:  Inpatient    1955 MRN [id number]   [facility] 3SW-A Attending Annamarie Kayser, MD   Hosp Day # 4 PCP [facility] CULTURE     Status: None    Collection Time: 04/12/19  1:41 PM   Result Value Ref Range    Tissue Culture Result No Growth 3 Days N/A    Tissue Smear 1+ WBCs seen N/A    Tissue Smear No organisms seen N/A   2.  ANAEROBIC CULTURE     Status: None (Preliminar

## 2019-04-16 NOTE — PROGRESS NOTES
[facility] dosing service    Follow-up Pharmacokinetic Consult for Vancomycin Dosing     Torrance Schaumann is a 61year old female who is being treated for Suspected L knee PJI s/p revision with poly exchange.    Patient is on day 4 of Vancomycin and is cu to assist in her care.     Abbey Nicole, PharmD  4/15/2019  11:38 PM  [address] [place] [address] Extension: [phone]

## 2019-04-16 NOTE — PROGRESS NOTES
[address] Hospitalist Progress Note     Sruthi Fallen Patient Status:  Inpatient    1955 MRN [id number]   [facility] 3SW-A Attending Charlet Gowers, MD   Hosp Day # 4 PCP Sandra Ramírez     CC: follow up    SUBJECTIVE:  A fib with ferrous sulfate  325 mg Oral Daily with breakfast   • anastrozole  1 mg Oral Daily   • Levothyroxine Sodium  150 mcg Oral Before breakfast     Continuous Infusing Medication:  • diltiazem 10 mg/hr (04/16/19 0945)   • sodium chloride 100 mL/hr at 04/13/19 2

## 2019-04-16 NOTE — PROGRESS NOTES
Patient has been  HR in 70's al day NSR, At 7:30 patient converted to Afib HR sustaining in 160's 170's. /80 . Denies SOB or chest pain. [facility] hospitalist on call was called to notify.   RRT team was called, 5mg IV metoprolol was given and DMG cardiolo

## 2019-04-17 PROCEDURE — A4216 STERILE WATER/SALINE, 10 ML: HCPCS | Performed by: INTERNAL MEDICINE

## 2019-04-17 RX ORDER — METOPROLOL SUCCINATE 50 MG/1
50 TABLET, EXTENDED RELEASE ORAL
Status: DISCONTINUED | OUTPATIENT
Start: 2019-04-17 | End: 2019-04-18

## 2019-04-17 RX ORDER — DIGOXIN 250 MCG
250 TABLET ORAL DAILY
Status: DISCONTINUED | OUTPATIENT
Start: 2019-04-17 | End: 2019-04-18

## 2019-04-17 RX ORDER — DIGOXIN 250 MCG
250 TABLET ORAL ONCE
Status: COMPLETED | OUTPATIENT
Start: 2019-04-17 | End: 2019-04-17

## 2019-04-17 NOTE — PROGRESS NOTES
[facility] Cardiology Progress Note - Melinda Pena Patient Status:  Inpatient    1955 MRN [id number]   [facility] 3SW-A Attending Vincent Hay MD   Hosp Day # 5 PCP Ilsa Mayo     Subjective:  Aleksandr Winters Osteoarthrosis     Infection of left knee ([facility] [address]  Global 07/09/2019     Orthopedic aftercare      Objective:   Temp: 98.5 °F (36.9 °C)  Pulse: 78  Resp: 16  BP: 129/76    Intake/Output:     Intake/Output Summary (Last 24 hours) at 4/17/2019 [facility]  Last data hours.     Allergies:     Amoxicillin             RASH  Lisinopril              SWELLING    Comment:Lip swelling  Penicillins             RASH    Medications:    Current Facility-Administered Medications:  Metoprolol Succinate ER (Toprol XL) 24 hr tab 25 mg Genital/: no dysuria,   Musculoskeletal: no joint complaints upper or lower extremities  Neuro: no sensory or motor complaint  Psyche: no symptoms of depression or anxiety  Hematology: denies bruising or excessive bleeding  Endocrine: no history of baldomero

## 2019-04-17 NOTE — CM/SW NOTE
Spoke with pt earlier re: HHC/IVAB at home. She has names of providers in case she receives calls from them. Spoke with RN- no d/c today per cardiology. Harry Felix with [facility] [phone]. She is updating [place] [phone] agency -[facility]. SW following.

## 2019-04-17 NOTE — PLAN OF CARE
Problem: POD 5 Lt knee I&D     Data: Pt is A+Ox4, on room air, VSS. , SCD's & Tele applied. Aquacel dressing is C/D/I w/ no drainage present. Instructed Pt to use IS 10x per Hr while awake. Pt tolerating diet well with no C/O N/V.  Pt voiding freely to r

## 2019-04-17 NOTE — PROGRESS NOTES
[facility] Hospitalist Progress Note     Aiden Tuttle Patient Status:  Inpatient    1955 MRN [id number]   [facility] 3SW-A Attending Alexander Gloria MD   Hosp Day # 5 PCP Leo Faith     CC: follow up    SUBJECTIVE:  Castro whitfield sodium chloride 100 mL/hr at 04/13/19 2008     PRN Medication:Normal Saline Flush, tiZANidine HCl, HYDROcodone-acetaminophen **OR** HYDROcodone-acetaminophen, PEG 3350, magnesium hydroxide, bisacodyl, FLEET ENEMA, diphenhydrAMINE **OR** diphenhydrAMINE HCl

## 2019-04-17 NOTE — PLAN OF CARE
Patient's pain has been well controled with PO pain medication, incision is CDI, voiding freely, had last BM 4/16. IV antibiotics infusing per orders.    HR has been in the 90's this morning going to low 100's with activity, Seen by Dr Luis Hughes and increased

## 2019-04-17 NOTE — PROGRESS NOTES
[facility]  TEL: [phone]  FAX: [phone]    Yajaira Xie Patient Status:  Inpatient    1955 MRN [id number]   [facility] 3SW-A Attending Juana Barfield MD   Hosp Day # 5 PCP [facility] None    Collection Time: 04/12/19  1:41 PM   Result Value Ref Range    Tissue Culture Result No Growth 3 Days N/A    Tissue Smear 1+ WBCs seen N/A    Tissue Smear No organisms seen N/A   2.  ANAEROBIC CULTURE     Status: None    Collection Time: 04/12/19  1

## 2019-04-18 VITALS
HEIGHT: 62.25 IN | WEIGHT: 203 LBS | SYSTOLIC BLOOD PRESSURE: 133 MMHG | BODY MASS INDEX: 36.89 KG/M2 | OXYGEN SATURATION: 97 % | HEART RATE: 76 BPM | TEMPERATURE: 98 F | DIASTOLIC BLOOD PRESSURE: 75 MMHG | RESPIRATION RATE: 18 BRPM

## 2019-04-18 PROCEDURE — A4216 STERILE WATER/SALINE, 10 ML: HCPCS | Performed by: INTERNAL MEDICINE

## 2019-04-18 RX ORDER — DIGOXIN 250 MCG
250 TABLET ORAL DAILY
Qty: 30 TABLET | Refills: 3 | Status: SHIPPED | OUTPATIENT
Start: 2019-04-19 | End: 2020-02-21

## 2019-04-18 RX ORDER — METOPROLOL SUCCINATE 50 MG/1
50 TABLET, EXTENDED RELEASE ORAL
Qty: 60 TABLET | Refills: 3 | Status: SHIPPED | OUTPATIENT
Start: 2019-04-18

## 2019-04-18 RX ORDER — VERAPAMIL HYDROCHLORIDE 40 MG/1
40 TABLET ORAL EVERY 8 HOURS SCHEDULED
Status: DISCONTINUED | OUTPATIENT
Start: 2019-04-18 | End: 2019-04-18

## 2019-04-18 NOTE — PROGRESS NOTES
[address] [facility]  TEL: [phone]  FAX: [phone]    Ofe Schulte Patient Status:  Inpatient    1955 MRN [id number]   [facility] 3SW-A Attending Trino Carson MD   Hosp Day # 6 PCP [facility] Status: None    Collection Time: 04/12/19  1:41 PM   Result Value Ref Range    Tissue Culture Result No Growth 3 Days N/A    Tissue Smear 1+ WBCs seen N/A    Tissue Smear No organisms seen N/A   2.  ANAEROBIC CULTURE     Status: None    Collection Time: 04/

## 2019-04-18 NOTE — PROGRESS NOTES
[facility] Hospitalist Progress Note     Agusto Manzanares Patient Status:  Inpatient    1955 MRN [id number]   [facility] 3SW-A Attending Davonte Wise MD   Hosp Day # 6 PCP Rae Serra     CC: follow up    SUBJECTIVE:  Pt sit Sodium  150 mcg Oral Before breakfast     Continuous Infusing Medication:  • diltiazem 2.5 mg/hr (04/18/19 0904)   • sodium chloride 100 mL/hr at 04/13/19 2008     PRN Medication:Normal Saline Flush, tiZANidine HCl, HYDROcodone-acetaminophen **OR** HYDROco

## 2019-04-18 NOTE — PROGRESS NOTES
NURSING DISCHARGE NOTE    Discharged Home via Wheelchair. Accompanied by Family member  Belongings Taken by patient/family. All scripts were filled in by OP. All discharge instructions were discussed with the patient and verbalized understanding.

## 2019-04-18 NOTE — PAYOR COMM NOTE
REF:[id number]  - DAYS PENDING IN [address] [place] [address] FOR 4/12/19- 4/15/19 FAXED ON 4/16/19- FAXING CLINICALS FOR 4/16/19 AND 4/17/19- REQUESTING APPROVAL FOR DAYS        4/16/19   SUBJECTIVE:  A fib with RVR last night with rates to 160s  Cards consulted diphenhydrAMINE HCl          Assessment/Plan:      # Left knee prosthetic joint infection  - ortho following  - s/p irrigation and debridement, poly exchange  - ID following, pt on empiric dapto and CTX  - follow cultures - even if negative, will treat wit CO2 26.0  --   --  28.0   BUN 11  --   --  8   CREATSERUM 0.71 0.66 0.56 0.52*  0.52*   CA 8.3*  --   --  8.5   MG  --   --   --  2.3   *  --   --  93            Meds:   Scheduled Medication:  • Metoprolol Succinate ER  25 mg Oral 2x Daily(Beta Bl

## 2019-04-18 NOTE — PLAN OF CARE
Problem: POD 6Lt knee I&D     Data: Pt is A+Ox4, on room air, VSS. , SCD's & Tele applied. D/C held d/t HR and Cardizem gtt. Cards decreased Cardizem gtt to 5mg/hr, increased Metoprolol. Hr has been 's. HR has gone as high as 130's with activity.

## 2019-04-18 NOTE — CM/SW NOTE
Informed by RN that pt is cleared for d/c home today. SW updated Jyothi Figueroa with [facility]. She will notify [facility] about need for [facility] tomorrow. She will also call pt to coordinate delivery of IVABs/supplies. Spoke with pt re: above.  Jyothi Figueroa with [facility]

## 2019-04-18 NOTE — PROGRESS NOTES
[facility] Cardiology Progress Note - Nurys Yang Patient Status:  Inpatient    1955 MRN [id number]   [facility] 3SW-A Attending Chiki Darby MD   Hosp Day # 6 PCP Rohan Stevens     Subjective:  Shweta Rodriguez kg)      Tele: NSR    Physical Exam:    General: Alert and oriented x 3. No apparent distress. No respiratory or constitutional distress. HEENT: Normocephalic, anicteric sclera, neck supple, no thyromegaly or adenopathy.   Neck: No JVD, carotids 2+, no bru mg/mL premix/add-vantage 5 mg/hr Intravenous Continuous   cefTRIAXone Sodium (ROCEPHIN) 2 g in sodium chloride 0.9% 100 mL MBP/ADD-vantage 2 g Intravenous Q24H   0.9%  NaCl infusion  Intravenous Continuous   tiZANidine HCl (ZANAFLEX) tab 2 mg 2 mg Oral TID

## 2019-04-18 NOTE — CM/SW NOTE
No d/c expected for today per RN during d/c rounds. Pt continues on Cardizem drip with medication adjustments. Spoke with Alejandra Narayan at [facility] [phone] with update. She is keeping [facility] updated as well. SW following.   Will need to notify [facility] at

## 2019-04-18 NOTE — PLAN OF CARE
Patient has been weaned off cardizem gtt , HR remains in Afib in the 70's - 90's at rest and in the low 100's with activity. This afternoon has been cleared by cardiology for discharge. BP remains stable. She received today's dose of IV antibiotics.  Incis

## 2019-04-19 NOTE — CM/SW NOTE
04/19/19 1500   Discharge disposition   Expected discharge disposition Home-Health   Name of Facillity/Home Care/Hospice   ([facility]/ [facility])   Home services after discharge Skilled home care; Other (comment)  (IVAB)   Discharge transportation Private c

## 2019-04-19 NOTE — PAYOR COMM NOTE
--------------  DISCHARGE REVIEW    Payor: JONATAN ARGUELLES  Subscriber #:  [id number]  Authorization Number: [id number]    Admit date: 4/12/19  Admit time:  4652  Discharge Date: 4/18/2019  6:34 PM  Requesting authorization 4/15-4/18. Thank you.      Admitting

## 2019-04-19 NOTE — DISCHARGE SUMMARY
Discharge Summary  Patient ID:  Najma Frias  [id number]  61year old  11/20/1955    Admit date: 4/12/2019    Discharge date and time: 4/18/19    Attending Physician: No att. providers found     Reason for admission: Infection of left knee ([facility] [address] [M00. 9]

## 2019-04-24 NOTE — PAYOR COMM NOTE
REF:  APPROVED 4/12/19- 4/15/19 PER IEXCHANGE-  CLINICALS FOR 4/15/19 - 4/17/19 FAXED ON 4/18/19 - REFAXED TODAY PER REQUEST - REQUESTING APPROVAL FOR ADDITIONAL DAYS    Discharge date:4/18/19

## 2019-10-14 ENCOUNTER — TELEPHONE (OUTPATIENT)
Dept: HEMATOLOGY/ONCOLOGY | Facility: HOSPITAL | Age: 64
End: 2019-10-14

## 2019-10-14 NOTE — TELEPHONE ENCOUNTER
[facility] called and asked that the order for a bone dexa scan please be sent to them as the patient is currently in the office to get this done. The fax number that it can be sent to is [phone].  Attempted to reach the nurse wit

## 2020-02-16 RX ORDER — ANASTROZOLE 1 MG/1
TABLET ORAL
Qty: 90 TABLET | Refills: 3 | Status: SHIPPED | OUTPATIENT
Start: 2020-02-16 | End: 2020-08-10

## 2020-02-21 ENCOUNTER — OFFICE VISIT (OUTPATIENT)
Dept: HEMATOLOGY/ONCOLOGY | Facility: HOSPITAL | Age: 65
End: 2020-02-21
Attending: INTERNAL MEDICINE
Payer: COMMERCIAL

## 2020-02-21 VITALS
DIASTOLIC BLOOD PRESSURE: 81 MMHG | SYSTOLIC BLOOD PRESSURE: 145 MMHG | HEART RATE: 72 BPM | RESPIRATION RATE: 18 BRPM | BODY MASS INDEX: 37 KG/M2 | WEIGHT: 206.63 LBS | OXYGEN SATURATION: 97 % | TEMPERATURE: 98 F

## 2020-02-21 DIAGNOSIS — Z85.3 HISTORY OF BREAST CANCER: Primary | ICD-10-CM

## 2020-02-21 PROCEDURE — 99213 OFFICE O/P EST LOW 20 MIN: CPT | Performed by: INTERNAL MEDICINE

## 2020-02-21 RX ORDER — DOXYCYCLINE HYCLATE 100 MG
100 TABLET ORAL 2 TIMES DAILY
COMMUNITY
Start: 2020-02-02

## 2020-02-21 NOTE — PROGRESS NOTES
Patient is here for MD f/u for breast cancer. Patient continues on Anastrazole. Patient is feeling well. Denies pain. Appetite and energy level is good.        Education Record    Learner:  Patient    Disease / Diagnosis: breast cancer     Barriers / [place]

## 2020-02-25 NOTE — PROGRESS NOTES
[facility]    PATIENT'S NAME: Pily Adairstujuice   ATTENDING PHYSICIAN: Sharrie Councilman, M.D.    PATIENT ACCOUNT #: [de-identified] LOCATION: [address] [place] [address] RECORD #: [id number] YOB: 1955   DATE OF SERVICE: 02/21/2020       CANCER RUTH daily, coenzyme-Q10 daily, doxycycline 100 mg b.i.d., hydrocodone (which she is not taking), levothyroxine 150 mcg daily, metoprolol succinate extended release 50 mg b.i.d., multivitamin daily, Tylenol daily, verapamil 180 mg daily, and vitamin D 1000 unit another one done reasonably soon, but we will do this in the next year or two. I will see her in followup in 1 year.     Dictated By Sharrie Councilman, M.D.  d: 02/24/2020 13:13:19  t: 02/25/2020 05:53:00  Tanna Somers [id number]  /

## 2020-08-10 RX ORDER — ANASTROZOLE 1 MG/1
1 TABLET ORAL DAILY
Qty: 90 TABLET | Refills: 3 | Status: SHIPPED | OUTPATIENT
Start: 2020-08-10 | End: 2021-08-09

## 2021-02-19 ENCOUNTER — OFFICE VISIT (OUTPATIENT)
Dept: HEMATOLOGY/ONCOLOGY | Facility: HOSPITAL | Age: 66
End: 2021-02-19
Attending: INTERNAL MEDICINE
Payer: MEDICARE

## 2021-02-19 VITALS
RESPIRATION RATE: 16 BRPM | WEIGHT: 211 LBS | SYSTOLIC BLOOD PRESSURE: 151 MMHG | TEMPERATURE: 98 F | DIASTOLIC BLOOD PRESSURE: 84 MMHG | OXYGEN SATURATION: 96 % | HEART RATE: 69 BPM | BODY MASS INDEX: 38.34 KG/M2 | HEIGHT: 62.24 IN

## 2021-02-19 DIAGNOSIS — Z78.0 ASYMPTOMATIC MENOPAUSAL STATE: Primary | ICD-10-CM

## 2021-02-19 DIAGNOSIS — Z85.3 HISTORY OF BREAST CANCER: ICD-10-CM

## 2021-02-19 PROCEDURE — 99213 OFFICE O/P EST LOW 20 MIN: CPT | Performed by: INTERNAL MEDICINE

## 2021-02-19 NOTE — PROGRESS NOTES
Patient is here for MD one year follow up for breast cancer. Continues on Anastrozole daily. Patient injured her left eye during her sleep. Eye is red but no pain or swelling. She feels well. Appetite and energy level are good.      Education Record    Kong Da Silva

## 2021-02-26 NOTE — PROGRESS NOTES
[facility]    PATIENT'S NAME: Cherelle Bundy   ATTENDING PHYSICIAN: Leo Biggs M.D.    PATIENT ACCOUNT #: [de-identified] LOCATION: [address] [place] [address] RECORD #: [id number] YOB: 1955   DATE OF SERVICE: 02/19/2021       CANCER RUTH mcg daily, metoprolol succinate extended release 50 mg twice daily, multivitamin daily, Tylenol p.r.n., verapamil extended release 180 mg daily, vitamin D 1000 units daily.     PHYSICAL EXAMINATION:    GENERAL:  She is a well-appearing female in no acute di through the [facility], and she will be doing it by the end of October this year.      Dictated By Jasmin Gray M.D.  d: 02/25/2021 15:22:45  t: 02/26/2021 03:59:42  Job [id number]  TT/    cc: Dr. Ortiz Heads   Dr. Hussein Johnston

## 2021-08-09 RX ORDER — ANASTROZOLE 1 MG/1
TABLET ORAL
Qty: 90 TABLET | Refills: 3 | Status: SHIPPED | OUTPATIENT
Start: 2021-08-09 | End: 2022-07-25

## 2022-02-25 ENCOUNTER — OFFICE VISIT (OUTPATIENT)
Dept: HEMATOLOGY/ONCOLOGY | Facility: HOSPITAL | Age: 67
End: 2022-02-25
Attending: INTERNAL MEDICINE
Payer: MEDICARE

## 2022-02-25 VITALS
SYSTOLIC BLOOD PRESSURE: 160 MMHG | TEMPERATURE: 98 F | HEART RATE: 76 BPM | OXYGEN SATURATION: 96 % | WEIGHT: 210 LBS | BODY MASS INDEX: 38.15 KG/M2 | HEIGHT: 62.24 IN | RESPIRATION RATE: 16 BRPM | DIASTOLIC BLOOD PRESSURE: 84 MMHG

## 2022-02-25 DIAGNOSIS — Z78.0 ASYMPTOMATIC MENOPAUSAL STATE: Primary | ICD-10-CM

## 2022-02-25 DIAGNOSIS — Z85.3 HISTORY OF BREAST CANCER: ICD-10-CM

## 2022-02-25 PROCEDURE — 99213 OFFICE O/P EST LOW 20 MIN: CPT | Performed by: INTERNAL MEDICINE

## 2022-02-25 NOTE — PROGRESS NOTES
Patient is here for MD f/u for Breast cancer. Patient has been on Anastrozole daily. C/o generalized joint aches but overall manageable. Patient is had a mammogram and a DEXA scan in November 2021.        Education Record    Learner:  Patient    Disease / Diagnosis:  Breast cancer     Barriers / Limitations:  None   Comments:    Method:  Discussion   Comments:    General Topics:  Plan of care reviewed   Comments:    Outcome:  Shows understanding   Comments:

## 2022-03-01 ENCOUNTER — TELEPHONE (OUTPATIENT)
Dept: HEMATOLOGY/ONCOLOGY | Facility: HOSPITAL | Age: 67
End: 2022-03-01

## 2022-03-01 NOTE — TELEPHONE ENCOUNTER
Left her a message that the bone density was worse and she needs a treatment for it. Hip is now osteoporotic - both total hip and femoral neck. I would recommend prolia. Asked her to call us back.   NEWTON Shay

## 2022-03-01 NOTE — PROGRESS NOTES
[facility]    PATIENT'S NAME: Abraham Gricelda   ATTENDING PHYSICIAN: Shant Verdugo M.D. PATIENT ACCOUNT #: [de-identified] LOCATION: [address] [place] [address] RECORD #: [id number] YOB: 1955   DATE OF SERVICE: 02/25/2022       CANCER CENTER PROGRESS NOTE    CHIEF COMPLAINT:  Followup for history of breast cancer. HISTORY OF PRESENT ILLNESS:  The patient is a 27-year-old female. She has a relatively distant history of breast cancer. I last saw her 1 year ago. She presented with a T3 tumor in the breast and matted nodes in the axilla. She had neoadjuvant chemotherapy with dose-dense AC-T, at which time we used docetaxel instead of paclitaxel. She then had surgery. She had residual disease in her axilla with positive lymph nodes. She is ER positive and started on hormonal therapy. She had regional radiation. She continues to see Dr. Enrico Restrepo at the [address] [place] [address] in [facility]. She has continued on an aromatase inhibitor. She has been taking it for about 18 years. She has tolerated it from the standpoint of her bone density. She has chronic osteoarthritis. She has had joint replacements. She denies any new complaints to speak of, although she is somewhat limited overall. She has generalized achiness, but overall, it is manageable. She had a mammogram and a DEXA scan in November 2021. I have the mammogram, but I do not have the DEXA scan, and she will get me these results. MEDICATIONS:  Her current medications include anastrozole 1 mg daily; apixaban 5 mg b.i.d., which she takes for atrial fibrillation; calcium with vitamin D; coenzyme Q10 daily; hydrocodone 10/325, which she uses sparingly; levothyroxine 150 mcg daily; metoprolol succinate extended release 50 mg b.i.d.; multivitamin daily; Tylenol daily; verapamil 180 mg daily; and vitamin D 1000 units daily. PHYSICAL EXAMINATION:    GENERAL:  She is a moderately overweight female in no acute distress. VITAL SIGNS:  Her performance status is 0. Her weight is 210 pounds. Her blood pressure is 160/84, pulse is 76, respiratory rate is 20, temperature is 98. 3. HEENT:  Unremarkable. LYMPHATICS:  She has no adenopathy. LUNGS:  Clear. HEART:  Normal.  ABDOMEN:  No hepatosplenomegaly or tenderness. EXTREMITIES:  She has no clubbing, cyanosis, or edema. NEUROLOGIC:  She is intact. LABORATORY DATA:  No labs are available to me as they are done through her primary care office. IMPRESSION:  History of breast cancer. Given her high risk features, we have opted to proceed with endocrine treatment for a full 20 years. She understands that given the high risk nature of her disease, very few patients such as her were included in the extended aromatase inhibitor trials. Ten years is clearly better than 5. There is no absolute proof that 20 is better than 10, but given her T3N2 status at diagnosis, it is reasonable to continue with this. She is agreeable. She will send me the copies of the bone density, but I will see her briefly in 1 year.     Dictated By Patrizia Chamberlain M.D.  d: 02/28/2022 13:01:12  t: 03/01/2022 03:22:39  Job [id number]  HT/    cc: Dr. Teo Savage Adjutant

## 2022-03-02 PROBLEM — M81.8 OSTEOPOROSIS DUE TO AROMATASE INHIBITOR: Status: ACTIVE | Noted: 2022-03-02

## 2022-03-02 PROBLEM — T38.6X5A OSTEOPOROSIS DUE TO AROMATASE INHIBITOR: Status: ACTIVE | Noted: 2022-03-02

## 2022-03-02 NOTE — TELEPHONE ENCOUNTER
Spoke with patient regarding Dr Angel Salguero message. He is recommending Prolia based on recent DEXA scan. Patient agrees to starting Prolia. Orders placed. Await to schedule once prior Diana Ledesma is completed.

## 2022-03-10 ENCOUNTER — OFFICE VISIT (OUTPATIENT)
Dept: HEMATOLOGY/ONCOLOGY | Facility: HOSPITAL | Age: 67
End: 2022-03-10
Attending: INTERNAL MEDICINE
Payer: MEDICARE

## 2022-03-10 VITALS
DIASTOLIC BLOOD PRESSURE: 85 MMHG | HEIGHT: 62.24 IN | RESPIRATION RATE: 18 BRPM | SYSTOLIC BLOOD PRESSURE: 173 MMHG | OXYGEN SATURATION: 100 % | WEIGHT: 210 LBS | BODY MASS INDEX: 38.15 KG/M2 | TEMPERATURE: 98 F | HEART RATE: 72 BPM

## 2022-03-10 DIAGNOSIS — M81.8 OSTEOPOROSIS DUE TO AROMATASE INHIBITOR: Primary | ICD-10-CM

## 2022-03-10 DIAGNOSIS — T38.6X5A OSTEOPOROSIS DUE TO AROMATASE INHIBITOR: Primary | ICD-10-CM

## 2022-03-10 LAB
ALBUMIN SERPL-MCNC: 3 G/DL (ref 3.4–5)
CALCIUM BLD-MCNC: 9.2 MG/DL (ref 8.5–10.1)
CREAT BLD-MCNC: 0.72 MG/DL
MAGNESIUM SERPL-MCNC: 2.2 MG/DL (ref 1.6–2.6)
PHOSPHATE SERPL-MCNC: 3.1 MG/DL (ref 2.5–4.9)

## 2022-03-10 PROCEDURE — 96372 THER/PROPH/DIAG INJ SC/IM: CPT

## 2022-03-10 PROCEDURE — 82040 ASSAY OF SERUM ALBUMIN: CPT

## 2022-03-10 PROCEDURE — 82310 ASSAY OF CALCIUM: CPT

## 2022-03-10 PROCEDURE — 84100 ASSAY OF PHOSPHORUS: CPT

## 2022-03-10 PROCEDURE — 83735 ASSAY OF MAGNESIUM: CPT

## 2022-03-10 PROCEDURE — 36415 COLL VENOUS BLD VENIPUNCTURE: CPT

## 2022-03-10 PROCEDURE — 82565 ASSAY OF CREATININE: CPT

## 2022-03-10 NOTE — PROGRESS NOTES
Education Record    Learner:  Patient    Disease / Diagnosis: here for prolia injection    Barriers / Limitations:  None   Comments:    Method:  Discussion and Printed material   Comments:    General Topics:  Plan of care reviewed   Comments:    Outcome:  Shows understanding   Comments:    Prolia given per MD order without incident. Tolerated well. [facility] handout on Prolia given. Reinforced to continue to take calcium supplements. Printed AVS given and appointments reviewed. Discharged home in stable condition, no new complaints.

## 2022-07-25 RX ORDER — ANASTROZOLE 1 MG/1
TABLET ORAL
Qty: 90 TABLET | Refills: 3 | Status: SHIPPED | OUTPATIENT
Start: 2022-07-25 | End: 2023-02-24

## 2022-09-06 ENCOUNTER — TELEPHONE (OUTPATIENT)
Dept: HEMATOLOGY/ONCOLOGY | Facility: HOSPITAL | Age: 67
End: 2022-09-06

## 2022-09-07 NOTE — TELEPHONE ENCOUNTER
Spoke with patient. She was getting her handicap placard from Dr Vick Hayes but he has since retired. Advised patient to contact PCP for this, as Dr Maia Mckeon will also be retiring and she will need someone to follow up on this in the future. Patient verbalized understanding.

## 2022-09-15 ENCOUNTER — OFFICE VISIT (OUTPATIENT)
Dept: HEMATOLOGY/ONCOLOGY | Facility: HOSPITAL | Age: 67
End: 2022-09-15
Attending: INTERNAL MEDICINE
Payer: MEDICARE

## 2022-09-15 VITALS
DIASTOLIC BLOOD PRESSURE: 93 MMHG | HEART RATE: 76 BPM | TEMPERATURE: 99 F | RESPIRATION RATE: 18 BRPM | SYSTOLIC BLOOD PRESSURE: 181 MMHG | WEIGHT: 204.63 LBS | BODY MASS INDEX: 37.18 KG/M2 | HEIGHT: 62.24 IN | OXYGEN SATURATION: 96 %

## 2022-09-15 DIAGNOSIS — T38.6X5A OSTEOPOROSIS DUE TO AROMATASE INHIBITOR: Primary | ICD-10-CM

## 2022-09-15 DIAGNOSIS — M81.8 OSTEOPOROSIS DUE TO AROMATASE INHIBITOR: Primary | ICD-10-CM

## 2022-09-15 LAB
ALBUMIN SERPL-MCNC: 3.5 G/DL (ref 3.4–5)
CALCIUM BLD-MCNC: 9.5 MG/DL (ref 8.5–10.1)
CREAT BLD-MCNC: 0.9 MG/DL
GFR SERPLBLD BASED ON 1.73 SQ M-ARVRAT: 71 ML/MIN/1.73M2 (ref 60–?)
MAGNESIUM SERPL-MCNC: 2.3 MG/DL (ref 1.6–2.6)
PHOSPHATE SERPL-MCNC: 2.9 MG/DL (ref 2.5–4.9)

## 2022-09-15 PROCEDURE — 36415 COLL VENOUS BLD VENIPUNCTURE: CPT

## 2022-09-15 PROCEDURE — 96372 THER/PROPH/DIAG INJ SC/IM: CPT

## 2022-09-15 PROCEDURE — 82310 ASSAY OF CALCIUM: CPT

## 2022-09-15 PROCEDURE — 84100 ASSAY OF PHOSPHORUS: CPT

## 2022-09-15 PROCEDURE — 82040 ASSAY OF SERUM ALBUMIN: CPT

## 2022-09-15 PROCEDURE — 83735 ASSAY OF MAGNESIUM: CPT

## 2022-09-15 PROCEDURE — 82565 ASSAY OF CREATININE: CPT

## 2022-09-15 NOTE — PROGRESS NOTES
Education Record    Learner:  Patient    Disease / Diagnosis: Prolia    Barriers / Limitations:  None   Comments:    Method:  Discussion   Comments:    General Topics:  Plan of care reviewed   Comments:    Outcome:  Shows understanding   Comments:

## 2023-02-24 ENCOUNTER — OFFICE VISIT (OUTPATIENT)
Dept: HEMATOLOGY/ONCOLOGY | Facility: HOSPITAL | Age: 68
End: 2023-02-24
Attending: INTERNAL MEDICINE
Payer: MEDICARE

## 2023-02-24 VITALS
SYSTOLIC BLOOD PRESSURE: 161 MMHG | HEIGHT: 62.24 IN | DIASTOLIC BLOOD PRESSURE: 83 MMHG | WEIGHT: 205.25 LBS | HEART RATE: 66 BPM | RESPIRATION RATE: 16 BRPM | TEMPERATURE: 98 F | BODY MASS INDEX: 37.29 KG/M2 | OXYGEN SATURATION: 97 %

## 2023-02-24 DIAGNOSIS — Z85.3 HISTORY OF BREAST CANCER: ICD-10-CM

## 2023-02-24 DIAGNOSIS — Z78.0 ASYMPTOMATIC MENOPAUSAL STATE: Primary | ICD-10-CM

## 2023-02-24 PROCEDURE — 99213 OFFICE O/P EST LOW 20 MIN: CPT | Performed by: INTERNAL MEDICINE

## 2023-02-24 RX ORDER — CLINDAMYCIN HYDROCHLORIDE 300 MG/1
CAPSULE ORAL
COMMUNITY
Start: 2022-11-11

## 2023-02-24 RX ORDER — CETIRIZINE HYDROCHLORIDE 10 MG/1
10 TABLET ORAL DAILY
COMMUNITY

## 2023-02-24 RX ORDER — DENOSUMAB 60 MG/ML
1 INJECTION SUBCUTANEOUS
COMMUNITY
Start: 2022-06-17

## 2023-02-24 RX ORDER — ANASTROZOLE 1 MG/1
1 TABLET ORAL DAILY
Qty: 90 TABLET | Refills: 3 | Status: SHIPPED | OUTPATIENT
Start: 2023-02-24

## 2023-02-24 NOTE — PROGRESS NOTES
Patient is here for MD f/u for Breast cancer. On Arimidex daily. Patient had left eye cataract surgery in November. She had a DEXA scan in November 2021. Feeling well. No concerns at present time.        Education Record    Learner:  Patient    Disease / Diagnosis:  Breast cancer     Barriers / Limitations:  None   Comments:    Method:  Discussion   Comments:    General Topics:  Plan of care reviewed   Comments:    Outcome:  Shows understanding   Comments:

## 2023-02-28 NOTE — PROGRESS NOTES
[facility]    PATIENT'S NAME: Zackary Zhu   ATTENDING PHYSICIAN: Jihan Ledesma M.D. PATIENT ACCOUNT #: [de-identified] LOCATION: [address] [place] [address] RECORD #: [id number] YOB: 1955   DATE OF SERVICE: 02/24/2023       CANCER CENTER PROGRESS NOTE    CHIEF COMPLAINT:  Followup for history of breast cancer. HISTORY OF PRESENT ILLNESS:  The patient is a 26-year-old female. She had a distant history of breast cancer diagnosed mainly 20 years ago. I last saw her 1 year ago. She had a large tumor in the breast that was T3 and she had a matted conglomerated nodes in the axilla. She had neoadjuvant chemotherapy with dose-dense AC-T. We used docetaxel instead of paclitaxel. She then had surgery. She still had residual disease in the axilla and positive lymph nodes. She was ER positive and had regional radiation. She continues on an aromatase inhibitor. She has been taking it now for approximately 19 years given her high risk status. She has tolerated it from the standpoint of bone density. She has chronic osteoarthritis and has had joint replacements. She still has pain in her left knee even after the replacement. Overall, she has some mild generalized achiness. She has some arthritic changes in her hands, but otherwise feels reasonably well. She denies any cough or shortness of breath. She is here for her annual followup. She had left eye cataract surgery in November. She has continued also on Prolia injections. Her last bone density scan was done in November 2021 at [facility] and she was still in the osteoporotic range. She started the Prolia after that date. She has not had any acute dental issues and she has had no jaw problems. She has gotten 2 doses of Prolia so far. The next is due in March of this coming year and then again in the fall, and we are planning on repeat bone density afterward.     MEDICATIONS:  Her current medications include anastrozole 1 mg daily; apixaban 5 mg b.i.d.; calcium with vitamin D daily; cetirizine 10 mg daily; clindamycin prior to dental work; coenzyme Q10 daily; Prolia 60 mg every 6 months; hydrocodone 10/325, which she takes sparingly; levothyroxine 150 mcg daily; metoprolol succinate extended release 50 mg twice daily; multivitamin daily; Tylenol p.r.n.; verapamil extended release 180 mg daily; and vitamin D 1000 units daily. PHYSICAL EXAMINATION:    GENERAL:  She is a reasonably well-appearing female in no acute distress. VITAL SIGNS:  Her performance status is 0. Her weight is 205 pounds. Blood pressure is 161/83, pulse 66, respiratory rate is 20, temperature is 98.2. HEENT:  Unremarkable. LYMPHATICS:  She has no adenopathy. LUNGS:  Clear. HEART:  Normal.  ABDOMEN:  No hepatosplenomegaly or tenderness. EXTREMITIES:  She has no clubbing, cyanosis, or edema. NEUROLOGIC:  She is intact. IMPRESSION:  History of breast cancer. She continues to get regular imaging done through [facility] and she sees Dr. Shadi Fontana. She has now been on anastrazole for 19 years. I told her there is no data beyond the 10-year point, but given the extremely high risk situation that she had with residual matted nodes after neoadjuvant chemotherapy, I am continuing her on this. She is maintaining her bone density with this Prolia at the present time. She continued to get this for the next year. I have asked her to follow up with Dr. Luna Palomares in February 2024. Her risk of recurrence will extend all the way to the year 2025, but then should become relatively negligible thereafter.     Dictated By Michael Rivera M.D.  d: 02/27/2023 14:15:32  t: 02/28/2023 04:53:54  Job [id number]  /    cc: Dr. Sol Frances MD

## 2023-03-16 ENCOUNTER — OFFICE VISIT (OUTPATIENT)
Dept: HEMATOLOGY/ONCOLOGY | Facility: HOSPITAL | Age: 68
End: 2023-03-16
Attending: INTERNAL MEDICINE
Payer: MEDICARE

## 2023-03-16 VITALS
WEIGHT: 206.25 LBS | HEIGHT: 62.24 IN | OXYGEN SATURATION: 98 % | DIASTOLIC BLOOD PRESSURE: 81 MMHG | RESPIRATION RATE: 16 BRPM | SYSTOLIC BLOOD PRESSURE: 169 MMHG | TEMPERATURE: 98 F | BODY MASS INDEX: 37.47 KG/M2 | HEART RATE: 68 BPM

## 2023-03-16 DIAGNOSIS — T38.6X5A OSTEOPOROSIS DUE TO AROMATASE INHIBITOR: Primary | ICD-10-CM

## 2023-03-16 DIAGNOSIS — M81.8 OSTEOPOROSIS DUE TO AROMATASE INHIBITOR: Primary | ICD-10-CM

## 2023-03-16 LAB
ALBUMIN SERPL-MCNC: 3.2 G/DL (ref 3.4–5)
CALCIUM BLD-MCNC: 9.5 MG/DL (ref 8.5–10.1)
CREAT BLD-MCNC: 0.84 MG/DL
GFR SERPLBLD BASED ON 1.73 SQ M-ARVRAT: 76 ML/MIN/1.73M2 (ref 60–?)
MAGNESIUM SERPL-MCNC: 2.1 MG/DL (ref 1.6–2.6)
PHOSPHATE SERPL-MCNC: 3.5 MG/DL (ref 2.5–4.9)

## 2023-03-16 PROCEDURE — 82310 ASSAY OF CALCIUM: CPT

## 2023-03-16 PROCEDURE — 96372 THER/PROPH/DIAG INJ SC/IM: CPT

## 2023-03-16 PROCEDURE — 82040 ASSAY OF SERUM ALBUMIN: CPT

## 2023-03-16 PROCEDURE — 84100 ASSAY OF PHOSPHORUS: CPT

## 2023-03-16 PROCEDURE — 82565 ASSAY OF CREATININE: CPT

## 2023-03-16 PROCEDURE — 36415 COLL VENOUS BLD VENIPUNCTURE: CPT

## 2023-03-16 PROCEDURE — 83735 ASSAY OF MAGNESIUM: CPT

## 2023-07-21 ENCOUNTER — TELEPHONE (OUTPATIENT)
Dept: HEMATOLOGY/ONCOLOGY | Facility: HOSPITAL | Age: 68
End: 2023-07-21

## 2023-07-21 NOTE — TELEPHONE ENCOUNTER
Spoke with patient. She will not have enough refills to get her through her next visit with Dr Gayatri Obrien in February 2024. Informed patient, Dr Pate Gave gave her a one year supply when she was in for her follow up. Prescription might have been archived by the pharmacy. Patient will call the pharmacy to further investigate. If prescription is no longer valid, informed patient , she will need to schedule a follow up with Dr Gayatri Obrien sooner to get established. Patient verbalized understanding.

## 2023-09-21 ENCOUNTER — OFFICE VISIT (OUTPATIENT)
Dept: HEMATOLOGY/ONCOLOGY | Facility: HOSPITAL | Age: 68
End: 2023-09-21
Attending: INTERNAL MEDICINE
Payer: MEDICARE

## 2023-09-21 VITALS
TEMPERATURE: 98 F | DIASTOLIC BLOOD PRESSURE: 80 MMHG | BODY MASS INDEX: 37.43 KG/M2 | SYSTOLIC BLOOD PRESSURE: 148 MMHG | HEART RATE: 77 BPM | WEIGHT: 206 LBS | RESPIRATION RATE: 18 BRPM | OXYGEN SATURATION: 96 % | HEIGHT: 62.24 IN

## 2023-09-21 DIAGNOSIS — M81.8 OSTEOPOROSIS DUE TO AROMATASE INHIBITOR: Primary | ICD-10-CM

## 2023-09-21 DIAGNOSIS — T38.6X5A OSTEOPOROSIS DUE TO AROMATASE INHIBITOR: Primary | ICD-10-CM

## 2023-09-21 LAB
ALBUMIN SERPL-MCNC: 3.3 G/DL (ref 3.4–5)
CALCIUM BLD-MCNC: 9.6 MG/DL (ref 8.5–10.1)
CREAT BLD-MCNC: 0.74 MG/DL
EGFRCR SERPLBLD CKD-EPI 2021: 89 ML/MIN/1.73M2 (ref 60–?)
MAGNESIUM SERPL-MCNC: 2.3 MG/DL (ref 1.6–2.6)
PHOSPHATE SERPL-MCNC: 3.5 MG/DL (ref 2.5–4.9)

## 2023-09-21 PROCEDURE — 96372 THER/PROPH/DIAG INJ SC/IM: CPT

## 2023-09-21 PROCEDURE — 83735 ASSAY OF MAGNESIUM: CPT

## 2023-09-21 PROCEDURE — 82040 ASSAY OF SERUM ALBUMIN: CPT

## 2023-09-21 PROCEDURE — 84100 ASSAY OF PHOSPHORUS: CPT

## 2023-09-21 PROCEDURE — 82565 ASSAY OF CREATININE: CPT

## 2023-09-21 PROCEDURE — 82310 ASSAY OF CALCIUM: CPT

## 2023-09-21 PROCEDURE — 36415 COLL VENOUS BLD VENIPUNCTURE: CPT

## 2024-02-23 ENCOUNTER — APPOINTMENT (OUTPATIENT)
Dept: HEMATOLOGY/ONCOLOGY | Facility: HOSPITAL | Age: 69
End: 2024-02-23
Attending: INTERNAL MEDICINE
Payer: MEDICARE

## 2024-03-21 ENCOUNTER — APPOINTMENT (OUTPATIENT)
Dept: HEMATOLOGY/ONCOLOGY | Facility: HOSPITAL | Age: 69
End: 2024-03-21
Attending: INTERNAL MEDICINE
Payer: MEDICARE

## 2024-03-26 ENCOUNTER — APPOINTMENT (OUTPATIENT)
Dept: HEMATOLOGY/ONCOLOGY | Facility: HOSPITAL | Age: 69
End: 2024-03-26
Attending: INTERNAL MEDICINE
Payer: MEDICARE

## 2024-03-27 ENCOUNTER — OFFICE VISIT (OUTPATIENT)
Dept: HEMATOLOGY/ONCOLOGY | Facility: HOSPITAL | Age: 69
End: 2024-03-27
Attending: INTERNAL MEDICINE
Payer: MEDICARE

## 2024-03-27 VITALS
DIASTOLIC BLOOD PRESSURE: 77 MMHG | RESPIRATION RATE: 16 BRPM | BODY MASS INDEX: 37 KG/M2 | HEART RATE: 62 BPM | OXYGEN SATURATION: 97 % | WEIGHT: 202 LBS | SYSTOLIC BLOOD PRESSURE: 162 MMHG | TEMPERATURE: 98 F

## 2024-03-27 DIAGNOSIS — M81.8 OSTEOPOROSIS DUE TO AROMATASE INHIBITOR: Primary | ICD-10-CM

## 2024-03-27 DIAGNOSIS — T38.6X5A OSTEOPOROSIS DUE TO AROMATASE INHIBITOR: ICD-10-CM

## 2024-03-27 DIAGNOSIS — M81.8 OSTEOPOROSIS DUE TO AROMATASE INHIBITOR: ICD-10-CM

## 2024-03-27 DIAGNOSIS — Z85.3 HISTORY OF BREAST CANCER: Primary | ICD-10-CM

## 2024-03-27 DIAGNOSIS — T38.6X5A OSTEOPOROSIS DUE TO AROMATASE INHIBITOR: Primary | ICD-10-CM

## 2024-03-27 LAB
ALBUMIN SERPL-MCNC: 3.6 G/DL (ref 3.4–5)
CALCIUM BLD-MCNC: 10 MG/DL (ref 8.5–10.1)
CREAT BLD-MCNC: 0.92 MG/DL
EGFRCR SERPLBLD CKD-EPI 2021: 68 ML/MIN/1.73M2 (ref 60–?)
MAGNESIUM SERPL-MCNC: 2.3 MG/DL (ref 1.6–2.6)
PHOSPHATE SERPL-MCNC: 3.3 MG/DL (ref 2.5–4.9)

## 2024-03-27 PROCEDURE — 99215 OFFICE O/P EST HI 40 MIN: CPT | Performed by: INTERNAL MEDICINE

## 2024-03-27 PROCEDURE — 96372 THER/PROPH/DIAG INJ SC/IM: CPT

## 2024-03-27 NOTE — PROGRESS NOTES
Education Record    Learner:  Patient    Disease / Diagnosis:Prolia injection    Barriers / Limitations:  None   Comments:    Method:  Discussion   Comments:    General Topics:  Medication and Plan of care reviewed   Comments:    Outcome:  Shows understanding   Comments:

## 2024-03-27 NOTE — PROGRESS NOTES
Cancer Center Progress Note    Problem List:      Patient Active Problem List   Diagnosis    Osteoarthrosis, localized, primary, knee    Genu valgum (acquired)    Cyst, Baker's knee    Swelling of extremity, left    Pain in joint, lower leg    S/P TKR (total knee replacement)    Postoperative stitch abscess    Weakness of left leg    History of breast cancer    Knee effusion, right    Left knee pain, unspecified chronicity    Status post right knee replacement    Status post left knee replacement    Fall, initial encounter    Contusion of right knee, initial encounter    Knee instability, left    Atrial fibrillation (HCC)    Benign essential hypertension    Disorder of adrenal gland (HCC)    Gastroesophageal reflux disease    Hypothyroidism    Intermittent palpitations    Microscopic hematuria    Osteopenia    Sleep apnea    Osteoarthrosis    Infection of left knee (HCC)  Global 07/09/2019    Orthopedic aftercare    Osteoporosis due to aromatase inhibitor       Interim History:    Ashley Jang presents today for evaluation and management of a diagnosis of breast cancer.    The patient is doing well. She has been on anastrozole for over 20 years. This is her first visit with me. She had been following with Dr. Shay who retired last year.       She had a distant history of breast cancer diagnosed over 20 years ago. She had a large tumor in the breast that was T3 and she had a matted conglomerated nodes in the axilla. She had neoadjuvant chemotherapy with dose-dense AC-T. We used docetaxel instead of paclitaxel. She then had surgery. She still had residual disease in the axilla and positive lymph nodes. She was ER positive and had regional radiation. She continues on an aromatase inhibitor. She has been taking it now for over 20 years given her high risk status.     Her last bone density scan was done in November 2023 at [facility]. I do not have these results. The prior bone dexa from 2021  showed worsening osteoporosis. She started the Prolia in 2021. She has not had any acute dental issues and she has had no jaw problems. She is due for Prolia today.      Review of Systems:   Constitutional: Negative for anorexia, fatigue, fevers, chills, night sweats and weight loss.  Eyes: Negative for visual disturbance, irritation and redness.  Respiratory: Negative for cough, hemoptysis, chest pain, or dyspnea.  Cardiovascular: Negative for angina, orthopnea or palpitations.  Gastrointestinal: Negative for nausea, vomiting, change in bowel habits, diarrhea, constipation and abdominal pain.  Integument/breast: Negative for rash, skin lesions, and pruritus.  Hematologic/lymphatic: Negative for easy bruising, bleeding, and lymphadenopathy.  Genitourinary: Negative for dysuria or hematuria  Neurological: Negative for headaches, dizziness, speech problems, gait problems and focal weakness.  Psychiatric: The patient's mood was calm and appropriate for this visit.  The pertinent positives and negatives were described. All other systems were negative.    PMH/PSH:  Past Medical History:   Diagnosis Date    Arrhythmia     A fib and PVC's    Cancer (HCC)     Breast cancer 2002    Exposure to medical diagnostic radiation     History of kidney stones     HYPOTHYROIDISM     OSTEOARTHRITIS     Osteoarthrosis, unspecified whether generalized or localized, unspecified site     Other and unspecified personal history of malignant neoplasm     breast s/p chemo,xrt,lumpectomy 7/2002    Personal history of antineoplastic chemotherapy     PONV (postoperative nausea and vomiting)     Visual impairment     conracts, glasses       Past Surgical History:   Procedure Laterality Date    CATARACT Left 11/2022    ECHO 2D, CARDIO (INTERNAL)  2016    KNEE REPLACEMENT SURGERY Right 04/06/2011    right knee    KNEE REPLACEMENT SURGERY Left 2012    OTHER SURGICAL HISTORY      lumpectomy 2003 right breast    OTHER SURGICAL HISTORY      knee scope  2008    SPECIAL SERVICE OR REPORT      s/p lumpectomy    STRESS TEST SCAN  2016       Family History Reviewed:  Family History   Problem Relation Age of Onset    Cancer Father         lung ca    Heart Disorder Father     Heart Disorder Mother     Other Mother         asthma       Allergies:     Allergies   Allergen Reactions    Lisinopril SWELLING     Lip swelling   Lip swelling   Lip swelling     Amoxicillin RASH    Atorvastatin UNKNOWN    Amoxicillin RASH    Penicillins RASH       Medications:   clindamycin 300 MG Oral Cap TAKE 2 CAPSULES BY MOUTH ONE HOUR PRIOR TO DENTAL APPOINTMENT      denosumab (PROLIA) 60 MG/ML Subcutaneous Solution Prefilled Syringe Inject 1 mL (60 mg total) into the skin.      cetirizine 10 MG Oral Tab Take 1 tablet (10 mg total) by mouth daily.      anastrozole 1 MG Oral Tab tab Take 1 tablet (1 mg total) by mouth daily. 90 tablet 3    HYDROcodone-acetaminophen (NORCO)  MG Oral Tab Take 1-2 tablets by mouth every 4 (four) hours as needed. 30 tablet 0    verapamil HCl  MG Oral Tab CR Take 1 tablet (180 mg total) by mouth daily with food.      Metoprolol Succinate ER 50 MG Oral Tablet 24 Hr Take 1 tablet (50 mg total) by mouth 2x Daily(Beta Blocker). 60 tablet 3    apixaban 5 MG Oral Tab Take 1 tablet (5 mg total) by mouth 2 (two) times daily. Patient prefers Brand name only      Coenzyme Q10 (COQ10 OR) Take by mouth daily.      Levothyroxine Sodium (SYNTHROID) 150 MCG Oral Tab Take by mouth. Patient prefers Brand name only      VITAMIN D 1000 UNIT OR CAPS 1 CAPSULE DAILY      MULTI-VITAMIN OR TABS 1 TABLET DAILY      TYLENOL OR PRN      CALCIUM + D + K 750-500-40 MG-UNT-MCG OR TABS 1 TABLET DAILY WITH FOOD           Vital Signs:      Height: --  Weight: 91.6 kg (202 lb) (03/27 1026)  BSA (Calculated - sq m): --  Pulse: 62 (03/27 1026)  BP: 162/77 (03/27 1026)  Temp: 98.1 °F (36.7 °C) (03/27 1026)  Do Not Use - Resp Rate: --  SpO2: 97 % (03/27 1026)      Physical  Examination:    Constitutional: Patient is alert and oriented x 3, not in acute distress.  Eyes: Anicteric sclera, pink conjunctiva.  HEENT:  Oropharynx is clear. Neck is supple.  Respiratory: Clear to auscultation and percussion. No rales.  No wheezes.  Cardiovascular: Regular rate and rhythm. No murmurs.  Gastrointestinal: Soft, non tender with good bowel sounds.  Musculoskeletal: No edema. No calf tenderness.  Neurological: Grossly intact without focal motor or sensory deficit.  Skin: No suspicious skin lesion, no rash, no ulceration.  Lymphatics: There is no palpable lymphadenopathy throughout in the cervical, supraclavicular, or axillary regions.  Psychiatric: The patient's mood is calm and appropriate for this visit.      Labs reviewed at this visit:     Lab Results   Component Value Date    WBC 7.6 04/15/2019    RBC 3.94 04/15/2019    HGB 10.4 (L) 04/15/2019    HCT 32.6 (L) 04/15/2019    MCV 82.7 04/15/2019    MCH 26.4 04/15/2019    MCHC 31.9 04/15/2019    RDW 13.3 04/15/2019    .0 04/15/2019     Lab Results   Component Value Date     04/16/2019    K 4.0 04/16/2019     04/16/2019    CO2 28.0 04/16/2019    BUN 8 04/16/2019    CREATSERUM 0.74 09/21/2023    GLU 93 04/16/2019    CA 9.6 09/21/2023    ALKPHO 80 07/28/2007    ALT 12 07/28/2007    AST 13 07/28/2007    ALB 3.3 (L) 09/21/2023    TP 7.3 07/28/2007       Radiologic imaging reviewed at this visit:    DEXA on 11/2021:  Lumbar spine T -0.4  Left Hip T - 2.5  Femoral Neck T -2.8     Assessment/Plan:     History of breast cancer. She continues to get regular imaging done through [facility] and she sees Dr. Cope. She had a mammogram in 11/2023. I will get these results for our review. She has now been on anastrazole over 20 years. I told her there is no data beyond the 10-year point. I discussed that it is possible that the risks are greater than the benefit since we do not have any data. After a full discussion we decided to  stop the AI. I will get the DEXA results from 11/2023. She will continue the Prolia for now. I will see her in one year.    This visit lasted 40 minutes with 35 minutes for discussion of her history, examination and discussion of management of the endocrine therapy and follow up. 5 minutes for post visit charting.      David Shah MD

## 2024-03-27 NOTE — PROGRESS NOTES
Patient is here for follow up for breast cancer on anastrozole.  She is tolerating this well.  She is transferring care after Dr. Shay's long-term.    She feels very comfortable being on the medication.  She would like to discuss her bone health. Her last dexa scan was in March 2022.   She tries to be active with walking.  She has normal aches and pains but no specific acute pain.   She is eating well.      Education Record    Learner:  Patient    Disease / Diagnosis: Breast cancer    Barriers / Limitations:  None   Comments:    Method:  Discussion   Comments:    General Topics:  Side effects and symptom management   Comments:    Outcome:  Shows understanding   Comments:      Requested results of mammogram and bone density testing from [facility] via fax at [phone] per requirements given via phone at [phone].

## 2024-09-26 ENCOUNTER — OFFICE VISIT (OUTPATIENT)
Dept: HEMATOLOGY/ONCOLOGY | Facility: HOSPITAL | Age: 69
End: 2024-09-26
Attending: INTERNAL MEDICINE
Payer: MEDICARE

## 2024-09-26 VITALS
SYSTOLIC BLOOD PRESSURE: 151 MMHG | HEART RATE: 60 BPM | WEIGHT: 198.19 LBS | HEIGHT: 62.24 IN | TEMPERATURE: 98 F | DIASTOLIC BLOOD PRESSURE: 83 MMHG | RESPIRATION RATE: 16 BRPM | OXYGEN SATURATION: 95 % | BODY MASS INDEX: 36.01 KG/M2

## 2024-09-26 DIAGNOSIS — T38.6X5A OSTEOPOROSIS DUE TO AROMATASE INHIBITOR: Primary | ICD-10-CM

## 2024-09-26 DIAGNOSIS — M81.8 OSTEOPOROSIS DUE TO AROMATASE INHIBITOR: Primary | ICD-10-CM

## 2024-09-26 LAB
ALBUMIN SERPL-MCNC: 4.6 G/DL (ref 3.2–4.8)
CALCIUM BLD-MCNC: 10.1 MG/DL (ref 8.7–10.4)
CREAT BLD-MCNC: 0.92 MG/DL
EGFRCR SERPLBLD CKD-EPI 2021: 68 ML/MIN/1.73M2 (ref 60–?)
MAGNESIUM SERPL-MCNC: 2 MG/DL (ref 1.6–2.6)
PHOSPHATE SERPL-MCNC: 4.2 MG/DL (ref 2.4–5.1)

## 2024-09-26 PROCEDURE — 82040 ASSAY OF SERUM ALBUMIN: CPT

## 2024-09-26 PROCEDURE — 96372 THER/PROPH/DIAG INJ SC/IM: CPT

## 2024-09-26 PROCEDURE — 84100 ASSAY OF PHOSPHORUS: CPT

## 2024-09-26 PROCEDURE — 82310 ASSAY OF CALCIUM: CPT

## 2024-09-26 PROCEDURE — 83735 ASSAY OF MAGNESIUM: CPT

## 2024-09-26 PROCEDURE — 82565 ASSAY OF CREATININE: CPT

## 2024-09-26 PROCEDURE — 36415 COLL VENOUS BLD VENIPUNCTURE: CPT

## 2024-09-26 NOTE — PROGRESS NOTES
Education Record    Learner:  Patient    Disease / Diagnosis: Osteoporosis    Barriers / Limitations:  None   Comments:    Method:  Brief focused and Reinforcement   Comments:    General Topics:  Plan of care reviewed   Comments:    Outcome:  Shows understanding   Comments:    Patient tolerated Prolia injection and discharged in stable condition.

## 2024-11-25 ENCOUNTER — TELEPHONE (OUTPATIENT)
Dept: HEMATOLOGY/ONCOLOGY | Facility: HOSPITAL | Age: 69
End: 2024-11-25

## 2024-11-25 NOTE — TELEPHONE ENCOUNTER
SKIP Green at Dr Cope Office the [facility]     Is requesting the last progress note from the pt last office visit    Please fax to [phone]

## 2025-03-26 ENCOUNTER — APPOINTMENT (OUTPATIENT)
Age: 70
End: 2025-03-26
Attending: INTERNAL MEDICINE
Payer: MEDICARE

## 2025-07-29 NOTE — TELEPHONE ENCOUNTER
Dr. Molina has documented on a separate encounter. A STAT referral was placed yesterday.   Patient returning call from Dr Javy So

## (undated) DIAGNOSIS — T38.6X5A OSTEOPOROSIS DUE TO AROMATASE INHIBITOR: Primary | ICD-10-CM

## (undated) DIAGNOSIS — M81.8 OSTEOPOROSIS DUE TO AROMATASE INHIBITOR: Primary | ICD-10-CM

## (undated) DEVICE — STERILE POLYISOPRENE POWDER-FREE SURGICAL GLOVES: Brand: PROTEXIS

## (undated) DEVICE — 2C14 #2 PDO 45 X 45: Brand: 2C14 #2 PDO 45 X 45

## (undated) DEVICE — SOL  .9 1000ML BTL

## (undated) DEVICE — SUTURE ETHIBOND 1 OS-6

## (undated) DEVICE — GAMMEX® PI HYBRID SIZE 8.5, STERILE POWDER-FREE SURGICAL GLOVE, POLYISOPRENE AND NEOPRENE BLEND: Brand: GAMMEX

## (undated) DEVICE — WRAP COOLING KNEE W/ICE PILLOW

## (undated) DEVICE — KENDALL SCD EXPRESS SLEEVES, KNEE LENGTH, MEDIUM: Brand: KENDALL SCD

## (undated) DEVICE — BACTISURE WOUND LAVAGE

## (undated) DEVICE — TOTAL KNEE CDS: Brand: MEDLINE INDUSTRIES, INC.

## (undated) DEVICE — SOL  .9 3000ML

## (undated) DEVICE — Device: Brand: STABLECUT®

## (undated) DEVICE — DRESSING AQUACEL AG 3.5X12

## (undated) DEVICE — SUTURE VICRYL 2-0 FSL

## (undated) DEVICE — SUTURE VICRYL 0 CP-1

## (undated) DEVICE — CHLORAPREP 26ML APPLICATOR

## (undated) DEVICE — UNIVERSAL STERIBUMP® STERILE (5/CASE): Brand: UNIVERSAL STERIBUMP®

## (undated) DEVICE — ZIMMER® STERILE DISPOSABLE TOURNIQUET CUFF WITH PLC, DUAL PORT, SINGLE BLADDER, 34 IN. (86 CM)

## (undated) DEVICE — STOCKINETTE HYDROMED 8X6

## (undated) NOTE — LETTER
Ofelia Berrios [address] [facility]     PICC LINE INSERTION CONSENT     I agree to have a Peripherally Inserted Central Catheter (PICC) placed in my arm.    1. The PICC insertion procedure, care, maintenance, risks, benefits, and c also discussed reasonable alternatives to the PICC, including risks, benefits, and side effects related to the alternatives and risks related to not receiving this procedure      _____________________ ___________ ____________________________________   Date

## (undated) NOTE — MR AVS SNAPSHOT
After Visit Summary   2/10/2017    Natan Miranda    MRN: [id number]           Diagnoses this Visit     History of breast cancer    -  Primary       Allergies     Amoxicillin Rash    Lisinopril Swelling    Lip swelling       Your Vital Signs Were

## (undated) NOTE — IP AVS SNAPSHOT
Patient Demographics     Address  [place]  [address] Phone  [phone] [facility])  [phone] (Work)  [phone] [facility]) E-mail Address  [email or web address]      Emergency Contact(s)     Name Relation Home Work Mobile Contact information:  Stefany KNIGHT [address] [facility]  [phone]             Linda Adler MD. Schedule an appointment as soon as possible for a visit in 1 week.     Specialty:  SURGERY, ORTHOPEDIC  Contact informatio Metoprolol Succinate ER 50 MG Tb24  Commonly known as: Toprol XL  Next dose due:  4/19/19 @9am      Take 1 tablet (50 mg total) by mouth 2x Daily(Beta Blocker).    SHYAM Horton         MULTI-VITAMIN Tabs  Next dose due:  4/19/19 @9am      1 TABLET DA [id number] HYDROcodone-acetaminophen (NORCO)  MG per tab 1 tablet 04/18/19 0612 Given      [id number] HYDROcodone-acetaminophen (NORCO)  MG per tab 1 tablet 04/18/19 1141 Given      [id number] Levothyroxine Sodium (SYNTHROID) tab 150 mcg 04/18/19 Tissue Aerobic Culture [[id number]] Collected:  04/12/19 1341    Order Status:  Completed Lab Status:  Final result Updated:  04/15/19 1908    Specimen:  Tissue from knee, left      Tissue Culture Result No Growth 3 Days     Tissue Smear 1+ WBCs seen The implant itself was not loose. She had no gross instability. She did have a 2+ effusion. I recommended a knee sleeve. I subsequently ordered a sed rate and CRP for the patient; both were elevated.   I aspirated the knee because of her ongoing pain a recreational drugs. FAMILY HISTORY:  Brother is alive; he is a family practice doctor. Father is ; he had cancer and heart disease. Mother has a heart disorder. Two sisters, alive. REVIEW OF SYSTEMS:  Negative.       PHYSICAL EXAMINATION: ADMISSION DATE:       04/12/2019      CONSULT DATE:  04/16/2019    REPORT OF CONSULTATION    REASON FOR CONSULTATION:  A 51-year-old female with chief complaint of rapid heart beat. IMPRESSION:    1.    Paroxysmal atrial fibrillation (converted spontaneo hypertension, paroxysmal atrial fibrillation, anticoagulation, sleep apnea. MEDICATIONS:  Home medications include Eliquis, clindamycin, metoprolol, losartan, amlodipine, levothyroxine. ALLERGIES:  Amoxicillin, lisinopril, penicillins.     FAMILY HIST No notes of this type exist for this encounter. Video Swallow Study Notes    No notes of this type exist for this encounter. SLP Notes    No notes of this type exist for this encounter.      Immunizations     Name Date      Depo-Medrol 40mg Inj 02/1

## (undated) NOTE — LETTER
[facility] Department  Phone: [phone]  OUTSIDE TESTING RESULT REQUEST      TO:   Dr Van Francois Date: 4/5/19    FAX #: [phone]     IMPORTANT: FOR YOUR IMMEDIATE ATTENTION  Please FAX all test results listed below to: [phone]